# Patient Record
Sex: FEMALE | Race: WHITE | Employment: FULL TIME | URBAN - METROPOLITAN AREA
[De-identification: names, ages, dates, MRNs, and addresses within clinical notes are randomized per-mention and may not be internally consistent; named-entity substitution may affect disease eponyms.]

---

## 2017-01-20 ENCOUNTER — TELEPHONE (OUTPATIENT)
Dept: FAMILY MEDICINE CLINIC | Age: 42
End: 2017-01-20

## 2017-03-13 RX ORDER — LEVOTHYROXINE SODIUM 0.15 MG/1
TABLET ORAL
Qty: 30 TABLET | Refills: 1 | Status: SHIPPED | OUTPATIENT
Start: 2017-03-13

## 2017-03-22 RX ORDER — ARIPIPRAZOLE 5 MG
TABLET ORAL
Qty: 30 TABLET | Refills: 1 | Status: SHIPPED | OUTPATIENT
Start: 2017-03-22

## 2017-06-08 ENCOUNTER — HOSPITAL ENCOUNTER (OUTPATIENT)
Dept: MAMMOGRAPHY | Age: 42
Discharge: HOME | End: 2017-06-08
Admitting: OBSTETRICS & GYNECOLOGY

## 2017-06-08 DIAGNOSIS — Z12.31 VISIT FOR SCREENING MAMMOGRAM: ICD-10-CM

## 2018-10-04 ENCOUNTER — HOSPITAL ENCOUNTER (OUTPATIENT)
Dept: MAMMOGRAPHY | Age: 43
Discharge: HOME | End: 2018-10-04
Payer: COMMERCIAL

## 2018-10-04 DIAGNOSIS — Z12.31 VISIT FOR SCREENING MAMMOGRAM: ICD-10-CM

## 2018-10-04 PROCEDURE — 77063 BREAST TOMOSYNTHESIS BI: CPT

## 2020-01-30 ENCOUNTER — HOSPITAL ENCOUNTER (OUTPATIENT)
Dept: MAMMOGRAPHY | Age: 45
Discharge: HOME | End: 2020-01-30
Payer: COMMERCIAL

## 2020-01-30 PROCEDURE — 77063 BREAST TOMOSYNTHESIS BI: CPT

## 2021-06-01 ENCOUNTER — HOSPITAL ENCOUNTER (OUTPATIENT)
Dept: MAMMOGRAPHY | Age: 46
Discharge: HOME | End: 2021-06-01
Payer: COMMERCIAL

## 2021-06-01 DIAGNOSIS — Z12.31 BREAST CANCER SCREENING BY MAMMOGRAM: ICD-10-CM

## 2021-06-01 PROCEDURE — 77063 BREAST TOMOSYNTHESIS BI: CPT

## 2022-08-08 ENCOUNTER — HOSPITAL ENCOUNTER (OUTPATIENT)
Dept: MAMMOGRAPHY | Age: 47
Discharge: HOME | End: 2022-08-08
Payer: COMMERCIAL

## 2022-08-08 DIAGNOSIS — Z12.31 BREAST CANCER SCREENING BY MAMMOGRAM: ICD-10-CM

## 2022-08-08 PROCEDURE — 77063 BREAST TOMOSYNTHESIS BI: CPT

## 2022-08-22 ENCOUNTER — OFFICE VISIT (OUTPATIENT)
Dept: ORTHOPEDIC SURGERY | Age: 47
End: 2022-08-22
Payer: COMMERCIAL

## 2022-08-22 VITALS — BODY MASS INDEX: 39.27 KG/M2 | WEIGHT: 230 LBS | HEIGHT: 64 IN

## 2022-08-22 DIAGNOSIS — M25.562 ACUTE PAIN OF LEFT KNEE: ICD-10-CM

## 2022-08-22 DIAGNOSIS — M17.12 PRIMARY OSTEOARTHRITIS OF LEFT KNEE: Primary | ICD-10-CM

## 2022-08-22 PROCEDURE — 99203 OFFICE O/P NEW LOW 30 MIN: CPT | Performed by: ORTHOPAEDIC SURGERY

## 2022-08-22 RX ORDER — MELOXICAM 15 MG/1
15 TABLET ORAL DAILY
Qty: 30 TABLET | Refills: 0 | Status: SHIPPED | OUTPATIENT
Start: 2022-08-22 | End: 2022-09-22

## 2022-08-22 RX ORDER — OMEPRAZOLE 40 MG/1
CAPSULE, DELAYED RELEASE ORAL
COMMUNITY
Start: 2022-06-28

## 2022-08-22 NOTE — PROGRESS NOTES
ORTHOPAEDIC SURGERY H&P / CONSULTATION NOTE    Chief complaint:   Chief Complaint   Patient presents with    Knee Pain     NP L KNEE      History of present illness: The patient is a 41 y.o. female with subjective symptoms of left knee pain. The chief complaint is located at left knee pain. Duration of symptoms has been for 4 days. The severity of symptoms is rated at 6/10 pain on intake form. Patient states lateral posterior lateral pain when she was in a Uvaldo class she was hopping and felt a discomfort/pop. She states that symptoms have gotten dramatically better. She denies significant swelling. She is been taking OTC anti-inflammatories and ice over the weekend. She stopped taking anti-inflammatory given its been feeling better. She denies mechanical twisting knee pain. She denies gross instability. She states that she is been doing Uvaldo to aid in her overall weight maintenance. She denies prior injury to the left knee but does state anterior and anterior lateral knee pain with ADLs and prolonged sit to stand and car rides. Dull throbbing aching pain. The patient has tried the below listed items prior to today's consultation for above listed chief complaint.     +   Over-the-counter anti-inflammatories/prescription medication anti-inflammatory. -   Physical therapy / guided home exercise program -     -   Previous corticosteroid injections    Past medical history:    Past Medical History:   Diagnosis Date    Depression     Hypothyroidism         Past surgical history:    Past Surgical History:   Procedure Laterality Date    CARPAL TUNNEL RELEASE      DILATION AND CURETTAGE OF UTERUS          Allergies:     Allergies   Allergen Reactions    Celexa [Citalopram]      Therapeutic Failure     Sulfa Antibiotics          Medications:   Current Outpatient Medications:     omeprazole (PRILOSEC) 40 MG delayed release capsule, TAKE ONE CAPSULE BY MOUTH DAILY, Disp: , Rfl:     meloxicam (MOBIC) 15 MG tablet, Take 1 tablet by mouth daily, Disp: 30 tablet, Rfl: 0    ABILIFY 5 MG tablet, TAKE ONE TABLET BY MOUTH DAILY, Disp: 30 tablet, Rfl: 1    levothyroxine (SYNTHROID) 150 MCG tablet, TAKE ONE TABLET BY MOUTH DAILY, Disp: 30 tablet, Rfl: 1    escitalopram (LEXAPRO) 20 MG tablet, TAKE ONE AND ONE-HALF TABLET BY MOUTH DAILY, Disp: 45 tablet, Rfl: 6    B Complex Vitamins (VITAMIN B COMPLEX PO), Take by mouth, Disp: , Rfl:     Ascorbic Acid (VITAMIN C) 500 MG tablet, Take 500 mg by mouth daily, Disp: , Rfl:      Social history: Denies IV drug use. Social History     Socioeconomic History    Marital status:      Spouse name: Not on file    Number of children: Not on file    Years of education: Not on file    Highest education level: Not on file   Occupational History    Not on file   Tobacco Use    Smoking status: Never    Smokeless tobacco: Never   Substance and Sexual Activity    Alcohol use: Yes     Comment: OCCAS. Drug use: No    Sexual activity: Not on file   Other Topics Concern    Not on file   Social History Narrative    Not on file     Social Determinants of Health     Financial Resource Strain: Not on file   Food Insecurity: Not on file   Transportation Needs: Not on file   Physical Activity: Not on file   Stress: Not on file   Social Connections: Not on file   Intimate Partner Violence: Not on file   Housing Stability: Not on file     Tobacco use. Social History     Tobacco Use   Smoking Status Never   Smokeless Tobacco Never     Em[place]    [place]    Review of systems: Patient denies any fevers chills chest pain shortness of breath nausea vomiting significant weight loss any change in voiding or bowel movements. Patient denies any significant numbness or tingling at baseline as it relates to this presenting symptom/chief complaint. The patient denies any significant problems with skin or any significant allergies.        Physical examination:  Body mass index is 39.48 kg/m². AAOx3, NCAT  EOMI  MMM  RR  Unlabored breathing, no wheezing  Skin intact BUE and BLE, warm and moist  Bilateral lower extremity examination specific to subjective symptoms  Exam Right Lower Extremity  Negative effusion, 0/122/0 active ROM (E/F/Lag), same P assive ROM (E/F/Lag), negative anterior Drawer, 1A Lachman, negative   posterior Drawer,   Stable varus/valgus at 0 and 30?,    none TTP Joint Line, negative Sofia, positive Nilesh's, positive lateral patellar facet tenderness. Skin intact throughout  5/5 IP Q H TA G EHL  SILT DP SP LP MP S S  +2 DP pulse      Diagnostic imaging:  MY READ:  4V R knee 8/22/22: Negative fracture. Positive arthrosis patellofemoral greater than lateral compartment greater than medial compartment. Pertinent lab work:  None       Diagnosis Orders   1. Primary osteoarthritis of left knee  meloxicam (MOBIC) 15 MG tablet      2. Acute pain of left knee            Assessment and plan: 41 y.o. female with current subjective symptoms and physical exam findings with diagnostic imaging correlating to left knee osteoarthritis. -Time of 16 minutes was spent coordinating and discussing the clinical findings, reviewing diagnostic imaging as indicated, coordinating care with prior notes review and current clinical encounter documentation as it pertains to the patient's presenting subjective symptoms and diagnoses. -I reviewed with the patient the imaging findings as well as clinical exam and  how it correlates to subjective symptoms.  -I had a pleasant discussion with the patient and family member that accompanies her. I reviewed with her at this time that her exam is effectively benign. There is no gross mechanical findings with regard to meniscal pathology either on exam or subjectively and her knee is clinically stable with regard to cruciate and collateral ligament testing.  -I reviewed with her consideration for conservative care treatment options.   This included No anti-inflammatories and therapy. She wishes to pursue this. Given current examination relatively well-appearing, I did not advocate for a corticosteroid injection today.  -Mobic 15 mg p.o. daily as needed pain and OTC Tylenol per bottle as needed discomfort  -Physician directed physical therapy program was printed out and given to the patient today. She will also work on low impact activities elliptical stationary bike swimming and walking  -I suspect over the next 7 days that she will have ultimate resolution of symptoms. Should she have any mechanical twisting knee pain or should pain persist in the next 2 to 3 weeks, then consideration for MRI left knee to rule out meniscal pathology and intra-articular pathology beyond anticipated chondromalacia/osteoarthritis given patient's clinical exam and current radiographic findings  -All questions answered to the patient's satisfaction and the patient expressed understanding and agreement with the above listed treatment plan  -Follow up in 2 to 3 weeks per the above  -Thank you for the clinical consultation and allowing me to participate in the patient's care. Electronically signed by Bailey Campoverde MD on 8/22/22 at 10:22 AM BLESSING Campoverde MD       Orthopaedic Surgery-Sports Medicine        Disclaimer: This note was dictated with voice recognition software. Though review and correction are routinely performed, please contact the office/medical records for any errors requiring correction. no dermatitis, no environmental allergies, no food allergies, no immunosuppressive disorder, and no pruritus.

## 2022-09-01 ENCOUNTER — TELEPHONE (OUTPATIENT)
Dept: ORTHOPEDIC SURGERY | Age: 47
End: 2022-09-01

## 2022-09-01 DIAGNOSIS — M25.562 ACUTE PAIN OF LEFT KNEE: Primary | ICD-10-CM

## 2022-09-01 NOTE — TELEPHONE ENCOUNTER
General Question     Subject: MRI LT KNEE  Patient and /or Facility Request: Haylee Mas  Contact Number: [phone]    PATIENT IS CALLING REQ MRI FOR LEFT KNEE.     PLEASE ADVISE

## 2022-09-02 NOTE — TELEPHONE ENCOUNTER
MRI order placed , will call patient when authorization is obtained. Patient called and is aware.  KB

## 2022-09-06 ENCOUNTER — TELEPHONE (OUTPATIENT)
Dept: ORTHOPEDIC SURGERY | Age: 47
End: 2022-09-06

## 2022-09-06 NOTE — TELEPHONE ENCOUNTER
MRI LT KNEE APPROVED. [facility] # [id number]  DATE RANGE 09/06/22-11/04/22. -[address] patient, order faxed , left message.  KB

## 2022-09-12 ENCOUNTER — TELEPHONE (OUTPATIENT)
Dept: ORTHOPEDIC SURGERY | Age: 47
End: 2022-09-12

## 2022-09-12 ENCOUNTER — OFFICE VISIT (OUTPATIENT)
Dept: ORTHOPEDIC SURGERY | Age: 47
End: 2022-09-12
Payer: COMMERCIAL

## 2022-09-12 VITALS — WEIGHT: 230 LBS | HEIGHT: 64 IN | BODY MASS INDEX: 39.27 KG/M2

## 2022-09-12 DIAGNOSIS — M22.42 CHONDROMALACIA OF LEFT PATELLA: ICD-10-CM

## 2022-09-12 DIAGNOSIS — S83.232A COMPLEX TEAR OF MEDIAL MENISCUS OF LEFT KNEE AS CURRENT INJURY, INITIAL ENCOUNTER: Primary | ICD-10-CM

## 2022-09-12 PROCEDURE — 99214 OFFICE O/P EST MOD 30 MIN: CPT | Performed by: ORTHOPAEDIC SURGERY

## 2022-09-12 NOTE — PROGRESS NOTES
FOLLOW UP ORTHOPAEDIC NOTE    The patient follows up today for reevaluation of left knee. The patient states she received her MRI and follows up for results today. She states that she is been having continued pain anterior as well as medial base/posterior based in the knee which she has noticed more so with ADLs. Previous Mobic has not adequately alleviated the patient's pain is so she switched over-the-counter medications but also has not alleviated her pain. She has modified her activities as well as having been doing the physician directed physical therapy program.  The pain has been limiting her ADLs    PE:  AAOx3  RR  Unlabored breathing  Skin warm and moist  Focused physical examination of the left knee  Positive Nilesh's, positive medial Sofia, positive medial Thessaly. Unchanged remainder of examination    Pertinent radiographs/imaging:  MRI 9/10/2022 left knee  CONCLUSION:   1. Medial meniscus: Complex tearing of the posterior root. Lateral meniscus: Intact. 2. Intact cruciate and collateral ligaments. 3. Patellofemoral compartment: Full-thickness chondromalacia of the median ridge and adjacent    medial and lateral patellar facet articular cartilage with underlying subchondral edema and    tiny subchondral mechanical erosion. 4. Medial tibiofemoral compartment: Focal high-grade chondromalacia in the central    weight-bearing femoral condyle articular cartilage measuring up to 1.1 cm anterior-posterior by    0.4 cm transverse. Preserved subchondral bone. 5. Small volume suprapatellar effusion. Decompressed/ruptured Baker's cyst.     MY READ: ACL intact with mucoid degeneration. PCL LCL MCL intact. No gross lateral meniscus tear. Positive posterior medial meniscal root tear with very slight medial extrusion. Positive patellar chondromalacia mild to moderate. Trace medial lateral compartment chondromalacia. Diagnosis Orders   1.  Complex tear of medial meniscus of left knee as current injury, initial encounter        2. Chondromalacia of left patella            Assessment and plan: 41 female with continued subjective symptoms of left knee pain with known, correlating diagnosis of left knee medial meniscal root tear and chondromalacia. -Time of 16 minutes was spent coordinating and discussing the clinical findings and diagnostic imaging results as they pertain to the patient's presenting subjective symptoms.  -I had a pleasant discussion with the patient today. I reviewed with her directly her MRI images. She continues to have functional limitations and daily pain. This was from a traumatic injury during Uvaldo class with weightbearing activity. This would appear to be chronic patellar chondromalacia in the setting of knee osteoarthritis/chondromalacia which was previously diagnosed. I also reviewed with her her posterior medial meniscal root tear which is present. I did review with her consideration for nonoperative versus operative treatment measures. Given meniscal pathology as well as chondromalacia/early mild osteoarthritis, this would be consideration for corticosteroid injection and continued activity modification versus consideration for left knee arthroscopic medial meniscal root repair and chondroplasty. She is been to think about her treatment options however is trending toward surgery and I reviewed with her the perioperative course and what it would entail.  -Continue activity modification for the time being and OTC Tylenol/Aleve per bottle as needed discomfort  -Authorizations to be submitted to insurance and we will follow-up with the patient as she states that she can work with her job. I did review with her likely 6 weeks of brace use and protected weightbearing with progressions given root repair.   She expressed understanding and overall treatment plan duration with expectations for symptomatic improvement with regard to mechanical twisting knee pain associated with meniscal pathology however with regard to patellar related chondromalacia/osteoarthritis with chondroplasty to be performed, possible additional treatment options to include conservative care options with regard to that entity should there be continued pain associated  -All questions answered to the patient's satisfaction and the patient expressed understanding and agreement with the above listed treatment plan  -Follow up in 10 to 12 days postop per routine  -Thank you for the clinical consultation and allowing me to participate in the patient's care. Electronically signed by Bailey Campoverde MD on 9/12/22 at 10:00 AM BLESSING Campoverde MD       Orthopaedic Surgery-Sports Medicine    Disclaimer: This note was dictated with voice recognition software. Though review and correction are routinely performed, please contact the office/medical records for any errors requiring correction.

## 2022-09-12 NOTE — TELEPHONE ENCOUNTER
General Question     Subject: PATIENT NEEDS A PROCEDURE CODE FOR HER INSURANCE   Patient and /or Facility Request: Viktoriya Hoffman  Contact Number:  [phone]    PATIENT NEEDS A PROCEDURE CODE FOR HER INSURANCE.  PLEASE CALL PATIENT BACK AT THE NUMBER ABOVE

## 2022-09-12 NOTE — LETTER
[address]   DR. Alvan Nyhan     TODAY'S DATE: 22    PATIENT'S NAME: Sachi Zhong    PATIENT'S NUMBER: [id number]    : 1975    PREFERRED PHONE NUMBER: [phone]      WORK PHONE NUMBER: [phone] (work)         DIAGNOSIS:   Left knee medial meniscus root tear, patellar chondromalacia    PROCEDURE: Left knee arthroscopic medial meniscal root repair, chondroplasty    SURGEON: Dr. Rosa Ellis: Elective    HOSPITAL: [facility]: Outpatient/Same Day Surgery    ANESTHESIA: General  Pre-Op Block requested NONE    LENGTH OF SURGERY: 1 Hr    EQUIPMENT REQUESTED: Arthrex medial meniscal root repair kit      X-RAYS REQUIRED: Mini C-ARM    ANTIBIOTICS: Ancef 2gm IV x 1    MEDICATIONS: TXA 1gm IV x1 at induction of anesthesia AND TXA 1gm IV x 1 at wound closure    LABS: None          PCP: Anyi Falcon MD    H&P TO BE DONE BY THE PCP      CARDIAC CLEARANCE NEEDED: No     ALLERGIES:   Allergies   Allergen Reactions    Celexa [Citalopram]      Therapeutic Failure     Sulfa Antibiotics        DME: Crutches and Knee Bregg Hinged Knee Brace    POST-OP VISIT: 10-12 days    OTHER INSTRUCTIONS/REMARKS: Arthrex medial mensicus root repair and button    INSURANCE INFORMATION: _________________________ CARD IN MEDIA IN EPIC___  CARD FAXED___    PRE-CERTIFICATION REQUIRED: YES   NO   PER _______________________    SURGERY SCHEDULED BY: ____________________________________    PATIENT CALLED AND CONFIRMED DATE & TIME: ______________________             Leti Marte MD       Orthopaedic Surgery - Sports Medicine

## 2022-09-13 ENCOUNTER — TELEPHONE (OUTPATIENT)
Dept: ORTHOPEDIC SURGERY | Age: 47
End: 2022-09-13

## 2022-09-13 NOTE — TELEPHONE ENCOUNTER
Surgery and/or Procedure Scheduling     Contact Name: Jude Lopez Request: LT KNEE  Patient Contact Number: [phone]    Patient is req a return call to schedule LT knee sx. Please return call to the above number.

## 2022-09-14 DIAGNOSIS — S83.232A COMPLEX TEAR OF MEDIAL MENISCUS OF LEFT KNEE AS CURRENT INJURY, INITIAL ENCOUNTER: Primary | ICD-10-CM

## 2022-09-15 ENCOUNTER — TELEPHONE (OUTPATIENT)
Dept: ORTHOPEDIC SURGERY | Age: 47
End: 2022-09-15

## 2022-09-15 NOTE — TELEPHONE ENCOUNTER
Auth: [employer]  Date: 09/23/22  Reference # [id number]  Spoke with: JOSEPH AUTOMATED CALL  Type of SX: OUTPATIENT  Location: [facility]  CPT: 52813, 84683   DX: [phone]  SX area:  KNEE  Insurance: [address] [place] [address]

## 2022-09-15 NOTE — PROGRESS NOTES
Reji Souzat    Age 41 y.o.    female    1975    MRN [id number]    9/23/2022  Arrival Time_____________  OR Time____________[facility]     Procedure(s):  VIDEO ARTHROSCOPY LEFT KNEE, MEDIAL MENISCAL ROOT REPAIR, CHONDROPLASTY                      General    Surgeon(s):  [facility], MD       Phone [phone] (home) [phone] (work)    [address] [place] [address]  Cell         Work  _____________________________________________________________________  _____________________________________________________________________  _____________________________________________________________________  _____________________________________________________________________  _____________________________________________________________________    PCP _____________________________ Phone_________________     H&P__________________Bringing      Chart            Epic   DOS      Called________  EKG__________________Bringing      Chart            Epic   DOS      Called________  LAB__________________ Bringing      Chart            Epic   DOS      Called________  Cardiac Clearance_______Bringing      Chart            Epic      DOS      Called________    Cardiologist________________________ Phone___________________________    ? Hoahaoism concerns / Waiver on Chart            [employer]________________  ? Pre-op Instructions Given [place]          _________________________________  ? Directions to Surgery Center                          _________________________________  ? Transportation Home_______________      __________________________________  ?  Crutches/Walker__________________        __________________________________    ________Pre-op Orders   _______Transcribed    _______Wt.  ________Pharmacy          _______SCD  ______VTE     ______TED Joy Obey  _______  Surgery Consent    _______  Anesthesia Consent         COVID DATE______________LOCATION________________ RESULT__________

## 2022-09-17 DIAGNOSIS — M17.12 PRIMARY OSTEOARTHRITIS OF LEFT KNEE: ICD-10-CM

## 2022-09-19 RX ORDER — MELOXICAM 15 MG/1
TABLET ORAL
Qty: 30 TABLET | Refills: 0 | OUTPATIENT
Start: 2022-09-19

## 2022-09-19 NOTE — TELEPHONE ENCOUNTER
I called and talke dto the patient. She stated she never called in for a refill. She also, stated that she stopped taking it.

## 2022-09-22 ENCOUNTER — ANESTHESIA EVENT (OUTPATIENT)
Dept: OPERATING ROOM | Age: 47
End: 2022-09-22
Payer: COMMERCIAL

## 2022-09-22 NOTE — PROGRESS NOTES

## 2022-09-22 NOTE — PROGRESS NOTES
Obstructive Sleep Apnea (LAMIN) Screening     Patient:  Lorie Orellana    YOB: 1975      Medical Record #:  [id number]                     Date:  9/22/2022     1. Are you a loud and/or regular snorer? []  Yes       [x] No    2. Have you been observed to gasp or stop breathing during sleep? []  Yes       [x] No    3. Do you feel tired or groggy upon awakening or do you awaken with a headache?           []  Yes       [] No    4. Are you often tired or fatigued during the wake time hours? []  Yes       [] No    5. Do you fall asleep sitting, reading, watching TV or driving? []  Yes       [] No    6. Do you often have problems with memory or concentration? []  Yes       [] No    **If patient's score is ? 3 they are considered high risk for LAMIN. An Anesthesia provider will evaluate the patient and develop a plan of care the day of surgery. Note:  If the patient's BMI is more than 35 kg m¯² , has neck circumference > 40 cm, and/or high blood pressure the risk is greater (© American Sleep Apnea Association, 2006).

## 2022-09-23 ENCOUNTER — HOSPITAL ENCOUNTER (OUTPATIENT)
Age: 47
Setting detail: OUTPATIENT SURGERY
Discharge: HOME | End: 2022-09-23
Attending: ORTHOPAEDIC SURGERY | Admitting: ORTHOPAEDIC SURGERY
Payer: COMMERCIAL

## 2022-09-23 ENCOUNTER — ANESTHESIA (OUTPATIENT)
Dept: OPERATING ROOM | Age: 47
End: 2022-09-23
Payer: COMMERCIAL

## 2022-09-23 VITALS
HEIGHT: 64 IN | DIASTOLIC BLOOD PRESSURE: 76 MMHG | OXYGEN SATURATION: 93 % | BODY MASS INDEX: 38.41 KG/M2 | TEMPERATURE: 97.2 F | SYSTOLIC BLOOD PRESSURE: 138 MMHG | RESPIRATION RATE: 16 BRPM | HEART RATE: 84 BPM | WEIGHT: 225 LBS

## 2022-09-23 DIAGNOSIS — Z47.89 ORTHOPEDIC AFTERCARE: Primary | ICD-10-CM

## 2022-09-23 LAB — PREGNANCY, URINE: NEGATIVE

## 2022-09-23 PROCEDURE — 6360000002 HC RX W HCPCS: Performed by: NURSE ANESTHETIST, CERTIFIED REGISTERED

## 2022-09-23 PROCEDURE — 2580000003 HC RX 258: Performed by: ANESTHESIOLOGY

## 2022-09-23 PROCEDURE — 3600000014 HC SURGERY LEVEL 4 ADDTL 15MIN: Performed by: ORTHOPAEDIC SURGERY

## 2022-09-23 PROCEDURE — 6370000000 HC RX 637 (ALT 250 FOR IP): Performed by: ANESTHESIOLOGY

## 2022-09-23 PROCEDURE — 6360000002 HC RX W HCPCS: Performed by: ORTHOPAEDIC SURGERY

## 2022-09-23 PROCEDURE — 2580000003 HC RX 258: Performed by: ORTHOPAEDIC SURGERY

## 2022-09-23 PROCEDURE — C1769 GUIDE WIRE: HCPCS | Performed by: ORTHOPAEDIC SURGERY

## 2022-09-23 PROCEDURE — 2709999900 HC NON-CHARGEABLE SUPPLY: Performed by: ORTHOPAEDIC SURGERY

## 2022-09-23 PROCEDURE — 2500000003 HC RX 250 WO HCPCS: Performed by: NURSE ANESTHETIST, CERTIFIED REGISTERED

## 2022-09-23 PROCEDURE — C1713 ANCHOR/SCREW BN/BN,TIS/BN: HCPCS | Performed by: ORTHOPAEDIC SURGERY

## 2022-09-23 PROCEDURE — 2500000003 HC RX 250 WO HCPCS: Performed by: ORTHOPAEDIC SURGERY

## 2022-09-23 PROCEDURE — 7100000010 HC PHASE II RECOVERY - FIRST 15 MIN: Performed by: ORTHOPAEDIC SURGERY

## 2022-09-23 PROCEDURE — 6360000002 HC RX W HCPCS: Performed by: ANESTHESIOLOGY

## 2022-09-23 PROCEDURE — 7100000011 HC PHASE II RECOVERY - ADDTL 15 MIN: Performed by: ORTHOPAEDIC SURGERY

## 2022-09-23 PROCEDURE — 7100000001 HC PACU RECOVERY - ADDTL 15 MIN: Performed by: ORTHOPAEDIC SURGERY

## 2022-09-23 PROCEDURE — 3700000000 HC ANESTHESIA ATTENDED CARE: Performed by: ORTHOPAEDIC SURGERY

## 2022-09-23 PROCEDURE — 3700000001 HC ADD 15 MINUTES (ANESTHESIA): Performed by: ORTHOPAEDIC SURGERY

## 2022-09-23 PROCEDURE — 2720000010 HC SURG SUPPLY STERILE: Performed by: ORTHOPAEDIC SURGERY

## 2022-09-23 PROCEDURE — 3600000004 HC SURGERY LEVEL 4 BASE: Performed by: ORTHOPAEDIC SURGERY

## 2022-09-23 PROCEDURE — 84703 CHORIONIC GONADOTROPIN ASSAY: CPT

## 2022-09-23 PROCEDURE — 29882 ARTHRS KNE SRG MNISC RPR M/L: CPT | Performed by: ORTHOPAEDIC SURGERY

## 2022-09-23 PROCEDURE — 7100000000 HC PACU RECOVERY - FIRST 15 MIN: Performed by: ORTHOPAEDIC SURGERY

## 2022-09-23 DEVICE — BIO-COMP SWVLK C, CLD 4.75X19.1MM
Type: IMPLANTABLE DEVICE | Site: KNEE | Status: FUNCTIONAL
Brand: ARTHREX®

## 2022-09-23 RX ORDER — BUPIVACAINE HYDROCHLORIDE 2.5 MG/ML
INJECTION, SOLUTION INFILTRATION; PERINEURAL PRN
Status: DISCONTINUED | OUTPATIENT
Start: 2022-09-23 | End: 2022-09-23 | Stop reason: HOSPADM

## 2022-09-23 RX ORDER — OXYCODONE HYDROCHLORIDE 5 MG/1
5 TABLET ORAL
Status: COMPLETED | OUTPATIENT
Start: 2022-09-23 | End: 2022-09-23

## 2022-09-23 RX ORDER — DEXAMETHASONE SODIUM PHOSPHATE 10 MG/ML
INJECTION INTRAMUSCULAR; INTRAVENOUS PRN
Status: DISCONTINUED | OUTPATIENT
Start: 2022-09-23 | End: 2022-09-23 | Stop reason: SDUPTHER

## 2022-09-23 RX ORDER — SODIUM CHLORIDE 0.9 % (FLUSH) 0.9 %
5-40 SYRINGE (ML) INJECTION PRN
Status: DISCONTINUED | OUTPATIENT
Start: 2022-09-23 | End: 2022-09-23 | Stop reason: HOSPADM

## 2022-09-23 RX ORDER — HYDRALAZINE HYDROCHLORIDE 20 MG/ML
10 INJECTION INTRAMUSCULAR; INTRAVENOUS
Status: DISCONTINUED | OUTPATIENT
Start: 2022-09-23 | End: 2022-09-23 | Stop reason: HOSPADM

## 2022-09-23 RX ORDER — DIPHENHYDRAMINE HYDROCHLORIDE 50 MG/ML
12.5 INJECTION INTRAMUSCULAR; INTRAVENOUS
Status: DISCONTINUED | OUTPATIENT
Start: 2022-09-23 | End: 2022-09-23 | Stop reason: HOSPADM

## 2022-09-23 RX ORDER — LIDOCAINE HYDROCHLORIDE 10 MG/ML
INJECTION, SOLUTION EPIDURAL; INFILTRATION; INTRACAUDAL; PERINEURAL PRN
Status: DISCONTINUED | OUTPATIENT
Start: 2022-09-23 | End: 2022-09-23 | Stop reason: SDUPTHER

## 2022-09-23 RX ORDER — HYDROCODONE BITARTRATE AND ACETAMINOPHEN 5; 325 MG/1; MG/1
1 TABLET ORAL EVERY 6 HOURS PRN
Qty: 20 TABLET | Refills: 0 | Status: SHIPPED | OUTPATIENT
Start: 2022-09-23 | End: 2022-09-28

## 2022-09-23 RX ORDER — MIDAZOLAM HYDROCHLORIDE 1 MG/ML
2 INJECTION INTRAMUSCULAR; INTRAVENOUS
Status: DISCONTINUED | OUTPATIENT
Start: 2022-09-23 | End: 2022-09-23 | Stop reason: HOSPADM

## 2022-09-23 RX ORDER — PROPOFOL 10 MG/ML
INJECTION, EMULSION INTRAVENOUS PRN
Status: DISCONTINUED | OUTPATIENT
Start: 2022-09-23 | End: 2022-09-23 | Stop reason: SDUPTHER

## 2022-09-23 RX ORDER — FENTANYL CITRATE 50 UG/ML
INJECTION, SOLUTION INTRAMUSCULAR; INTRAVENOUS PRN
Status: DISCONTINUED | OUTPATIENT
Start: 2022-09-23 | End: 2022-09-23 | Stop reason: SDUPTHER

## 2022-09-23 RX ORDER — LABETALOL HYDROCHLORIDE 5 MG/ML
INJECTION, SOLUTION INTRAVENOUS PRN
Status: DISCONTINUED | OUTPATIENT
Start: 2022-09-23 | End: 2022-09-23 | Stop reason: SDUPTHER

## 2022-09-23 RX ORDER — TRANEXAMIC ACID 100 MG/ML
1000 INJECTION, SOLUTION INTRAVENOUS ONCE
Status: DISCONTINUED | OUTPATIENT
Start: 2022-09-23 | End: 2022-09-23 | Stop reason: HOSPADM

## 2022-09-23 RX ORDER — DEXAMETHASONE SODIUM PHOSPHATE 10 MG/ML
4 INJECTION INTRAMUSCULAR; INTRAVENOUS
Status: DISCONTINUED | OUTPATIENT
Start: 2022-09-23 | End: 2022-09-23 | Stop reason: HOSPADM

## 2022-09-23 RX ORDER — HYDRALAZINE HYDROCHLORIDE 20 MG/ML
INJECTION INTRAMUSCULAR; INTRAVENOUS PRN
Status: DISCONTINUED | OUTPATIENT
Start: 2022-09-23 | End: 2022-09-23 | Stop reason: SDUPTHER

## 2022-09-23 RX ORDER — SODIUM CHLORIDE, SODIUM LACTATE, POTASSIUM CHLORIDE, CALCIUM CHLORIDE 600; 310; 30; 20 MG/100ML; MG/100ML; MG/100ML; MG/100ML
INJECTION, SOLUTION INTRAVENOUS CONTINUOUS
Status: DISCONTINUED | OUTPATIENT
Start: 2022-09-23 | End: 2022-09-23 | Stop reason: HOSPADM

## 2022-09-23 RX ORDER — SODIUM CHLORIDE 9 MG/ML
INJECTION, SOLUTION INTRAVENOUS PRN
Status: DISCONTINUED | OUTPATIENT
Start: 2022-09-23 | End: 2022-09-23 | Stop reason: HOSPADM

## 2022-09-23 RX ORDER — MIDAZOLAM HYDROCHLORIDE 1 MG/ML
INJECTION INTRAMUSCULAR; INTRAVENOUS PRN
Status: DISCONTINUED | OUTPATIENT
Start: 2022-09-23 | End: 2022-09-23 | Stop reason: SDUPTHER

## 2022-09-23 RX ORDER — ONDANSETRON 2 MG/ML
INJECTION INTRAMUSCULAR; INTRAVENOUS PRN
Status: DISCONTINUED | OUTPATIENT
Start: 2022-09-23 | End: 2022-09-23 | Stop reason: SDUPTHER

## 2022-09-23 RX ORDER — ACETAMINOPHEN 325 MG/1
650 TABLET ORAL
Status: DISCONTINUED | OUTPATIENT
Start: 2022-09-23 | End: 2022-09-23 | Stop reason: HOSPADM

## 2022-09-23 RX ORDER — ONDANSETRON 2 MG/ML
4 INJECTION INTRAMUSCULAR; INTRAVENOUS
Status: DISCONTINUED | OUTPATIENT
Start: 2022-09-23 | End: 2022-09-23 | Stop reason: HOSPADM

## 2022-09-23 RX ORDER — IPRATROPIUM BROMIDE AND ALBUTEROL SULFATE 2.5; .5 MG/3ML; MG/3ML
1 SOLUTION RESPIRATORY (INHALATION)
Status: DISCONTINUED | OUTPATIENT
Start: 2022-09-23 | End: 2022-09-23 | Stop reason: HOSPADM

## 2022-09-23 RX ORDER — SODIUM CHLORIDE 0.9 % (FLUSH) 0.9 %
5-40 SYRINGE (ML) INJECTION EVERY 12 HOURS SCHEDULED
Status: DISCONTINUED | OUTPATIENT
Start: 2022-09-23 | End: 2022-09-23 | Stop reason: HOSPADM

## 2022-09-23 RX ORDER — MEPERIDINE HYDROCHLORIDE 50 MG/ML
12.5 INJECTION INTRAMUSCULAR; INTRAVENOUS; SUBCUTANEOUS EVERY 5 MIN PRN
Status: DISCONTINUED | OUTPATIENT
Start: 2022-09-23 | End: 2022-09-23 | Stop reason: HOSPADM

## 2022-09-23 RX ORDER — ROCURONIUM BROMIDE 10 MG/ML
INJECTION, SOLUTION INTRAVENOUS PRN
Status: DISCONTINUED | OUTPATIENT
Start: 2022-09-23 | End: 2022-09-23 | Stop reason: SDUPTHER

## 2022-09-23 RX ADMIN — LIDOCAINE HYDROCHLORIDE 3 ML: 10 INJECTION, SOLUTION EPIDURAL; INFILTRATION; INTRACAUDAL; PERINEURAL at 10:35

## 2022-09-23 RX ADMIN — PROPOFOL 50 MG: 10 INJECTION, EMULSION INTRAVENOUS at 10:58

## 2022-09-23 RX ADMIN — FENTANYL CITRATE 50 MCG: 50 INJECTION INTRAMUSCULAR; INTRAVENOUS at 10:58

## 2022-09-23 RX ADMIN — HYDRALAZINE HYDROCHLORIDE 10 MG: 20 INJECTION INTRAMUSCULAR; INTRAVENOUS at 11:19

## 2022-09-23 RX ADMIN — SODIUM CHLORIDE, SODIUM LACTATE, POTASSIUM CHLORIDE, AND CALCIUM CHLORIDE: .6; .31; .03; .02 INJECTION, SOLUTION INTRAVENOUS at 11:24

## 2022-09-23 RX ADMIN — SUGAMMADEX 200 MG: 100 INJECTION, SOLUTION INTRAVENOUS at 12:44

## 2022-09-23 RX ADMIN — PROPOFOL 130 MG: 10 INJECTION, EMULSION INTRAVENOUS at 10:35

## 2022-09-23 RX ADMIN — ROCURONIUM BROMIDE 50 MG: 10 SOLUTION INTRAVENOUS at 10:35

## 2022-09-23 RX ADMIN — HYDROMORPHONE HYDROCHLORIDE 0.5 MG: 1 INJECTION, SOLUTION INTRAMUSCULAR; INTRAVENOUS; SUBCUTANEOUS at 13:03

## 2022-09-23 RX ADMIN — DEXAMETHASONE SODIUM PHOSPHATE 5 MG: 10 INJECTION INTRAMUSCULAR; INTRAVENOUS at 12:30

## 2022-09-23 RX ADMIN — CEFAZOLIN 2000 MG: 10 INJECTION, POWDER, FOR SOLUTION INTRAVENOUS at 10:40

## 2022-09-23 RX ADMIN — HYDROMORPHONE HYDROCHLORIDE 0.25 MG: 1 INJECTION, SOLUTION INTRAMUSCULAR; INTRAVENOUS; SUBCUTANEOUS at 13:41

## 2022-09-23 RX ADMIN — OXYCODONE HYDROCHLORIDE 5 MG: 5 TABLET ORAL at 13:24

## 2022-09-23 RX ADMIN — LABETALOL HYDROCHLORIDE 10 MG: 5 INJECTION, SOLUTION INTRAVENOUS at 11:22

## 2022-09-23 RX ADMIN — FENTANYL CITRATE 50 MCG: 50 INJECTION INTRAMUSCULAR; INTRAVENOUS at 10:31

## 2022-09-23 RX ADMIN — SODIUM CHLORIDE, SODIUM LACTATE, POTASSIUM CHLORIDE, AND CALCIUM CHLORIDE: .6; .31; .03; .02 INJECTION, SOLUTION INTRAVENOUS at 10:15

## 2022-09-23 RX ADMIN — MIDAZOLAM HYDROCHLORIDE 2 MG: 2 INJECTION, SOLUTION INTRAMUSCULAR; INTRAVENOUS at 10:31

## 2022-09-23 RX ADMIN — ONDANSETRON 4 MG: 2 INJECTION INTRAMUSCULAR; INTRAVENOUS at 12:30

## 2022-09-23 RX ADMIN — HYDROMORPHONE HYDROCHLORIDE 0.5 MG: 1 INJECTION, SOLUTION INTRAMUSCULAR; INTRAVENOUS; SUBCUTANEOUS at 13:17

## 2022-09-23 RX ADMIN — HYDRALAZINE HYDROCHLORIDE 10 MG: 20 INJECTION INTRAMUSCULAR; INTRAVENOUS at 11:16

## 2022-09-23 RX ADMIN — FENTANYL CITRATE 50 MCG: 50 INJECTION INTRAMUSCULAR; INTRAVENOUS at 11:06

## 2022-09-23 RX ADMIN — FENTANYL CITRATE 50 MCG: 50 INJECTION INTRAMUSCULAR; INTRAVENOUS at 10:52

## 2022-09-23 ASSESSMENT — PAIN DESCRIPTION - DESCRIPTORS: DESCRIPTORS: ACHING

## 2022-09-23 ASSESSMENT — PAIN DESCRIPTION - ORIENTATION
ORIENTATION: LEFT

## 2022-09-23 ASSESSMENT — PAIN SCALES - GENERAL
PAINLEVEL_OUTOF10: 8
PAINLEVEL_OUTOF10: 8
PAINLEVEL_OUTOF10: 0
PAINLEVEL_OUTOF10: 9
PAINLEVEL_OUTOF10: 8

## 2022-09-23 ASSESSMENT — PAIN DESCRIPTION - LOCATION
LOCATION: KNEE

## 2022-09-23 ASSESSMENT — ENCOUNTER SYMPTOMS: SHORTNESS OF BREATH: 1

## 2022-09-23 NOTE — ANESTHESIA POSTPROCEDURE EVALUATION
Department of Anesthesiology  Postprocedure Note    Patient: Isela Brunner  MRN: [id number]  YOB: 1975  Date of evaluation: 9/23/2022      Procedure Summary     Date: 09/23/22 Room / Location: [facility] [place] [address] [facility] [address] / [place]    Anesthesia Start: 1030 Anesthesia Stop: 8029    Procedure: VIDEO ARTHROSCOPY LEFT [facility], CHONDROPLASTY (Left: Knee) Diagnosis:       Acute medial meniscus tear of left knee, initial encounter      (LEFT KNEE MEDIAL MENISCUS ROOT TEAR, PATELLAR CHONDROMALACIA)    Surgeons: Arvind Lei MD Responsible Provider: Juana Martin MD    Anesthesia Type: MAC ASA Status: 2          Anesthesia Type: No value filed.     Bertha Phase I: Bertha Score: 7    Bertha Phase II:        Anesthesia Post Evaluation    Patient location during evaluation: PACU  Patient participation: complete - patient participated  Level of consciousness: awake  Pain score: 2  Airway patency: patent  Nausea & Vomiting: no nausea  Complications: no  Cardiovascular status: blood pressure returned to baseline  Respiratory status: acceptable  Hydration status: euvolemic

## 2022-09-23 NOTE — DISCHARGE INSTRUCTIONS
Orthopaedic Sports Medicine  Post-Operative Discharge Instructions      Pain Medication/Management  - Narcotic electronic-scribed to pharmacy listed in EPIC (Follow prescription instructions)  - IF taking a narcotic with acetaminophen then do not take additional acetaminophen. IF not, then Tylenol (650mg) - take 1 tab every 8 hours x 1 week  - Enteric Coated Aspirin (325mg) Over The Counter - take 1 tab daily x 3 weeks with food -  Start in AM on 9/24/22  - Colace (100mg) Over The Counter - take 1 tab twice daily as needed for a stool softener  * Wean off narcotic and start Tylenol (500mg) Over The Counter - take 1-2 tabs every 8 hours as needed for pain    Non weightbearing x 24hrs then 25% partial weightbearing left leg, use knee brace at all times except for hygiene. Ice left knee. Elevate left ankle above left knee and pump ankle up and down to circulate blood. May start -10 to 90 degrees range of motion on 9/24/22 by unlocking brace and may shower on 9/26/22, pat wounds dry air dry and then place gauze and ACE wrap to cover all wounds and then return to brace use. Bridging knee extension exercises a must.  Call PT in 24hrs to activate PT referral to start PT on 9/28/22. Check all paperwork for surgeon's follow up appointment date and time. Call office [phone] with any questions or concerns     * Day of Surgery - If you had a regional nerve block (extremity was numbed by anesthesia), it will wear off in 6-16 hours after surgery. It is recommended that you start the narcotic prescription once you get home to stay ahead of pain control even if the nerve block has not worn off yet. This will help limit severe pain from waking you up. It is also reasonable to set your alarm for every 6 hours so that you don't get behind in your pain control. * Elevation and Ice - For lower extremity surgery, elevate the operative extremity with rolled towels / pillows placed under the ankle/calf as tolerated. Move the position of towels or pillows around every so often to limit undue pressure to the back of the calf/heel. NEVER place pillows or blankets under the knee. For shoulder and upper extremity surgery, position pillows behind your back to aid in swelling reduction and comfort with sleep, and if it applies, keep the wrist above the elbow as tolerated while in the sling. Dressing/Shower  - If you had a shoulder or knee arthroscopy, you may shower in the PM on the 3rd post-operative day. Pat the wounds dry as well and place band-aids over the smaller wounds and gauze/ACE wrap over larger wounds. Do NOT submerge the wounds in hot-tubs, bathtubs, or ocean for at least 1 month postoperatively. - If you had a surgery other than the above listed (or if otherwise stated on hospital discharge instructions), then remove the dressing in the PM on the 7th post-operative day. Shower and pat the wounds dry, then place a clean dry dressing with ACE wrap over the wound(s). Brace  - Must ALWAYS wear the brace/sling applied by the surgeon. Unless otherwise instructed, unlock the brace the day after surgery to work on range of motion as it is set on the brace/listed in discharge instruction. May remove brace for hygiene. IF the operative extremity is in a well-padded splint/cast, do NOT remove it. It will be removed at the first post-operative office visit unless otherwise directed. Mobilization   - While recovery time is needed post-operatively, please attempt to get some fresh air outside as tolerated, out of bed/chair mobilization as tolerated using assist devices as prescribed and maintain weight-bearing status as directed by the surgeon in the discharge instructions. Open/close your hand and move your ankle up and down to help circulate the blood and limit swelling. Physical Therapy  - Please contact physical therapy no later than 2 days after your surgery date to schedule your first appointment with them. The first visit with them will likely be an introductory visit between the 3rd -7th postoperative day. Please contact my office at [phone] if there are any questions or concerns. ANESTHESIA DISCHARGE INSTRUCTIONS    You are under the influence of drugs- do not drink alcohol, drive a car, operate machinery(such as power tools, kitchen appliances, etc), sign legal documents, or make any important decisions for 24 hours (or while on pain medications). Children should not ride bikes or [facility] or play on gym sets  for 24 hours after surgery. A responsible adult should be with you for 24 hours. Rest at home today- increase activity as tolerated. Progress slowly to a regular diet unless your physician has instructed you otherwise. Drink plenty of water. CALL YOUR DOCTOR IF YOU:  Have moderate to severe nausea or vomiting AND are unable to hold down fluids or prescribed medications. Have bright red bloody drainage from your dressing that won't stop oozing. Do not get relief with your pain medication    NORMAL (POSSIBLE) SIDE EFFECTS FROM ANESTHESIA:     Confusion, temporary memory loss, delayed reaction times in the first 24 hours  Lightheadedness, dizziness, difficulty focusing, blurred vision  Nausea/vomiting can happen  Shivering, feeling cold, sore throat, cough and muscle aches should stop within 24-48 hours  Trouble urinating - call your surgeon if it has been more than 8 hrs  Bruising or soreness at the IV site - call if it remains red, firm or there is drainage             FEMALES OF CHILDBEARING AGE WHO ARE TAKING BIRTH CONTROL PILLS:  You may have received a medication during your procedure that interferes with the   actions of birth control pills (Bridion or Emend). Use some other kind of birth control in addition to your pills, like a condom, for 1 month after your procedure to prevent unwanted pregnancy.     The following instructions are to be followed if you have a known history or diagnosis of sleep apnea: For all sleep apnea patients:  ? Sleep on your side or sitting up in a chair whenever possible, especially the first 24 hours after surgery. ? Use only medicines prescribed by your doctor. ? Do not drink alcohol. ? If you have a dental device to assist you while at rest, use it at all times for the first 24 hours. For patients using CPAP machines:  ? Use your CPAP machine during all periods of sleep as usual.  ? Use your CPAP machine during all periods of daytime rest while on pain medicines. ** Follow up with your primary care doctor for continued care. IF YOU DO NOT TAKE ALL OF YOUR NARCOTIC PAIN MEDICATION, please dispose of them responsibly. There are drop off boxes in the Emergency Departments 24/7 at both [facility] and [facility]. If these locations are not convenient, other options for discarding them can be found at:  [email or web address]    Hospital or office staff may NOT accept any medications to drop off in the cabinet for you. What is a Surgical Site Infection or  (SSI)? A surgical site infection (SSI) is an infection that occurs after surgery in the part of the body where the surgery took place. Most patients who have surgery do not develop an infection. However, infections can develop in about 1-3 cases for every 100 patients who have had surgery. Our goal is for you to NOT experience any complications and be completely satisfied with your care! However, some signs or symptoms to look for and report immediately to your doctor are:   1. Fever above 101 degrees    2. Redness and increasing pain around the area  where you had surgery   3. Drainage of cloudy fluid or pus coming from the surgical area    Some of the things we/ you can do to prevent SSI's are:   1. Clean hands with soap and water or an alcohol-based hand rub before and after caring for the operative area. This occurs the day of surgery and for the next 2 weeks.    2.Sometimes you receive an appropriate antibiotic within 60 minutes before your surgery or take one for several days after surgery depending on your surgeon's instructions and/or the type of surgery you are having. 3. Family and/or friends who visit you should NOT touch the surgical wound or dressings until advised by your surgeon. 4. Be sure to elevate and decrease the swelling after your surgery to help prevent infection. 5. If you are a diabetic, you need to closely monitor your blood sugar levels and report any significant increases or changes to your surgeon to help promote the healing process.

## 2022-09-23 NOTE — ANESTHESIA PRE PROCEDURE
Department of Anesthesiology  Preprocedure Note       Name:  Riana Prajapati   Age:  41 y.o.  :  1975                                          MRN:  [id number]         Date:  2022      Surgeon: Raz Query):  Ricky Price MD    Procedure: Procedure(s):  VIDEO ARTHROSCOPY LEFT KNEE, MEDIAL MENISCAL ROOT REPAIR, CHONDROPLASTY    Medications prior to admission:   Prior to Admission medications    Medication Sig Start Date End Date Taking?  Authorizing Provider   VITAMIN D PO Take by mouth daily   Yes Historical Provider, MD   omeprazole (PRILOSEC) 40 MG delayed release capsule TAKE ONE CAPSULE BY MOUTH DAILY 22   Historical Provider, MD   ABILIFY 5 MG tablet TAKE ONE TABLET BY MOUTH DAILY 3/22/17   Maria T Arrieta MD   levothyroxine (SYNTHROID) 150 MCG tablet TAKE ONE TABLET BY MOUTH DAILY 3/13/17   Maria T Arrieta MD   escitalopram (LEXAPRO) 20 MG tablet TAKE ONE AND ONE-HALF TABLET BY MOUTH DAILY 16   Maria T Arrieta MD   B Complex Vitamins (VITAMIN B COMPLEX PO) Take by mouth daily    Historical Provider, MD   Ascorbic Acid (VITAMIN C) 500 MG tablet Take 500 mg by mouth daily    Historical Provider, MD       Current medications:    Current Facility-Administered Medications   Medication Dose Route Frequency Provider Last Rate Last Admin    ceFAZolin (ANCEF) 2000 mg in dextrose 5 % 100 mL IVPB  2,000 mg IntraVENous On Call to [address] [place] [address], MD        tranexamic acid (CYKLOKAPRON) injection 1,000 mg  1,000 mg IntraVENous Once Ricky Price MD        tranexamic acid (CYKLOKAPRON) injection 1,000 mg  1,000 mg IntraVENous Once Ricky Price MD        lactated ringers infusion   IntraVENous Continuous Kannan Meyer MD        sodium chloride flush 0.9 % injection 5-40 mL  5-40 mL IntraVENous 2 times per day Kannan Meyer MD        sodium chloride flush 0.9 % injection 5-40 mL  5-40 mL IntraVENous PRN Kannan Meyer MD        0.9 % sodium chloride infusion IntraVENous PRN Mendel Gulling, MD           Allergies: Allergies   Allergen Reactions    Celexa [Citalopram]      Therapeutic Failure     Sulfa Antibiotics        Problem List:    Patient Active Problem List   Diagnosis Code    Chronic LLQ pain R10.32, G89.29    Goiter E04.9    Depression with anxiety F41.8    OA (osteoarthritis) of knee M17.10    DJD (degenerative joint disease) M19.90    Chest pain R07.9    SOB (shortness of breath) on exertion R06.02    Abnormal EKG R94.31    Bilateral carpal tunnel syndrome G56.03       Past Medical History:        Diagnosis Date    Arthritis     knees    Depression     Hypothyroidism        Past Surgical History:        Procedure Laterality Date    CARPAL TUNNEL RELEASE Left     DILATION AND CURETTAGE OF UTERUS         Social History:    Social History     Tobacco Use    Smoking status: Never    Smokeless tobacco: Never   Substance Use Topics    Alcohol use: Yes     Alcohol/week: 14.0 standard drinks     Types: 14 Glasses of wine per week                                Counseling given: Not Answered      Vital Signs (Current):   Vitals:    09/22/22 0817 09/23/22 0830   BP:  103/80   Pulse:  (!) 103   Resp:  17   Temp:  96.8 °F (36 °C)   SpO2:  98%   Weight: 225 lb (102.1 kg)    Height: 5' 4\" (1.626 m)                                               BP Readings from Last 3 Encounters:   09/23/22 103/80   03/09/16 107/66   02/25/16 95/63       NPO Status: Time of last liquid consumption: 2100                        Time of last solid consumption: 2100                        Date of last liquid consumption: 09/22/22                        Date of last solid food consumption: 09/22/22    BMI:   Wt Readings from Last 3 Encounters:   09/22/22 225 lb (102.1 kg)   09/12/22 230 lb (104.3 kg)   08/22/22 230 lb (104.3 kg)     Body mass index is 38.62 kg/m².     CBC:   Lab Results   Component Value Date/Time    WBC 5.8 05/19/2015 10:28 PM    RBC 4.27 05/19/2015 10:28 PM    HGB 13.8 05/19/2015 10:28 PM    HCT 41.7 05/19/2015 10:28 PM    MCV 97.8 05/19/2015 10:28 PM    RDW 13.6 05/19/2015 10:28 PM     05/19/2015 10:28 PM       CMP:   Lab Results   Component Value Date/Time     05/19/2015 10:28 PM    K 4.6 05/19/2015 10:28 PM     05/19/2015 10:28 PM    CO2 23 05/19/2015 10:28 PM    BUN 16 05/19/2015 10:28 PM    CREATININE 0.8 05/19/2015 10:28 PM    GFRAA >60 05/19/2015 10:28 PM    GFRAA >60 04/12/2010 03:40 PM    AGRATIO 2.3 05/19/2015 10:28 PM    LABGLOM >60 05/19/2015 10:28 PM    GLUCOSE 88 05/19/2015 10:28 PM    PROT 7.0 05/19/2015 10:28 PM    PROT 6.8 04/12/2010 03:40 PM    CALCIUM 9.5 05/19/2015 10:28 PM    BILITOT 0.8 05/19/2015 10:28 PM    ALKPHOS 56 05/19/2015 10:28 PM    AST 21 05/19/2015 10:28 PM    ALT 16 05/19/2015 10:28 PM       POC Tests: No results for input(s): POCGLU, POCNA, POCK, POCCL, POCBUN, POCHEMO, POCHCT in the last 72 hours. Coags: No results found for: PROTIME, INR, APTT    HCG (If Applicable):   Lab Results   Component Value Date    PREGTESTUR Negative 09/23/2022        ABGs: No results found for: PHART, PO2ART, PHY8LFB, OGK3TAO, BEART, H8FOFCTE     Type & Screen (If Applicable):  No results found for: LABABO, LABRH    Drug/Infectious Status (If Applicable):  No results found for: HIV, HEPCAB    COVID-19 Screening (If Applicable): No results found for: COVID19        Anesthesia Evaluation  Patient summary reviewed and Nursing notes reviewed  Airway: Mallampati: II  TM distance: >3 FB   Neck ROM: full  Mouth opening: > = 3 FB   Dental: normal exam         Pulmonary:normal exam    (+) shortness of breath:                             Cardiovascular:    (+) MORAN:,                   Neuro/Psych:   (+) neuromuscular disease:, psychiatric history:            GI/Hepatic/Renal: Neg GI/Hepatic/Renal ROS            Endo/Other:    (+) hypothyroidism::., .                 Abdominal:             Vascular: negative vascular ROS.          Other Findings:           Anesthesia Plan      MAC     ASA 2       Induction: intravenous. MIPS: Postoperative opioids intended. Anesthetic plan and risks discussed with patient. Plan discussed with CRNA.     Attending anesthesiologist reviewed and agrees with Preprocedure content                DANNY Gallagher MD   9/23/2022

## 2022-09-23 NOTE — ANESTHESIA PRE PROCEDURE
Department of Anesthesiology  Preprocedure Note       Name:  Edilma Mcdaniels   Age:  41 y.o.  :  1975                                          MRN:  [id number]         Date:  2022      Surgeon: Rosa Duvall):  Parul Kirkland MD    Procedure: Procedure(s):  VIDEO ARTHROSCOPY LEFT KNEE, MEDIAL MENISCAL ROOT REPAIR, CHONDROPLASTY    Medications prior to admission:   Prior to Admission medications    Medication Sig Start Date End Date Taking?  Authorizing Provider   VITAMIN D PO Take by mouth daily   Yes Historical Provider, MD   omeprazole (PRILOSEC) 40 MG delayed release capsule TAKE ONE CAPSULE BY MOUTH DAILY 22   Historical Provider, MD   ABILIFY 5 MG tablet TAKE ONE TABLET BY MOUTH DAILY 3/22/17   Fabi Stanton MD   levothyroxine (SYNTHROID) 150 MCG tablet TAKE ONE TABLET BY MOUTH DAILY 3/13/17   Fabi Stanton MD   escitalopram (LEXAPRO) 20 MG tablet TAKE ONE AND ONE-HALF TABLET BY MOUTH DAILY 16   Fabi Stanton MD   B Complex Vitamins (VITAMIN B COMPLEX PO) Take by mouth daily    Historical Provider, MD   Ascorbic Acid (VITAMIN C) 500 MG tablet Take 500 mg by mouth daily    Historical Provider, MD       Current medications:    Current Facility-Administered Medications   Medication Dose Route Frequency Provider Last Rate Last Admin    ceFAZolin (ANCEF) 2000 mg in dextrose 5 % 100 mL IVPB  2,000 mg IntraVENous On Call to [address] [place] [address] MD        tranexamic acid (CYKLOKAPRON) injection 1,000 mg  1,000 mg IntraVENous Once Parul Kirkland MD        tranexamic acid (CYKLOKAPRON) injection 1,000 mg  1,000 mg IntraVENous Once Parul Kirkland MD        lactated ringers infusion   IntraVENous Continuous Naomi Nuñez MD        sodium chloride flush 0.9 % injection 5-40 mL  5-40 mL IntraVENous 2 times per day Naomi Nuñez MD        sodium chloride flush 0.9 % injection 5-40 mL  5-40 mL IntraVENous PRN Naomi Nuñez MD        0.9 % sodium chloride infusion

## 2022-09-23 NOTE — H&P
ORTHOPAEDIC SURGERY INTERVAL H&P    gS Chan was seen in the preoperative area, where a history and physical examination was reviewed and the patient was examined by me today. There have been no significant clinical changes since the completion of the previous recorded history and physical dated 8/22/22 and 9/12/22. The surgical site was confirmed by the patient and me and the surgical site was marked. The risks, benefits, and alternatives of the proposed procedure(s) have been explained to the patient (or appropriately confirmed guardian) and understanding was verbalized. Please see outpatient notes for details. All questions were answered and a signed documented consent has been placed in the patient's chart. The patient wishes to proceed. On call to the OR. Electronically signed by: Silvestre Oh MD,9/23/2022,9:10 AM         Silvestre Oh MD       Orthopaedic Surgery-Sports Medicine      Disclaimer: This note was dictated with voice recognition software. Though review and correction are routinely performed, please contact the office/medical records for any errors requiring correction.

## 2022-09-23 NOTE — OP NOTE
Operative Note      Patient: Willis Gallegos  YOB: 1975  MRN: [id number]    Date of Procedure: 9/23/2022    Pre-Op Diagnosis: LEFT KNEE MEDIAL MENISCUS ROOT TEAR, PATELLAR CHONDROMALACIA    Post-Op Diagnosis: Same       Procedure: VIDEO ARTHROSCOPY LEFT KNEE, MEDIAL MENISCAL ROOT REPAIR, CHONDROPLASTY    Surgeon(s):  Margarita Rocha MD    Assistant:   Surgical Assistant: Ronni Spencer    Anesthesia: General    Estimated Blood Loss (mL): Minimal    Complications: None    Specimens:   * No specimens in log *    Implants:  Implant Name Type Inv. Item Serial No.  Lot No. LRB No. Used Action   ANCHOR SUTURE BIOCOMP 4.75X19.1 MM [facility] - [id number]  ANCHOR SUTURE BIOCOMP 4.75X19.1 MM [employer] [facility]- [id number] Left 1 Implanted         Drains: * No LDAs found *    Findings: Medial meniscal root tear (roughly 8-10mm from the root with grade II/III changes tibia and grade III changes medial femoral condyle. This root tear was moreso subacute to chronic appearing given lack of injection/ecchymosis and medial compartment findings. There was if any 10% remaining as the remainder was a complex tear at the root and was non-functional. ACL PCL intact. No lateral meniscus tear. Grade III changes patella and Grade II changes trochlea. No gross loose bodies    Detailed Description of Procedure:     OPERATIVE INDICATIONS: Patient is a 41 y.o. female status post  left  knee injury sustaining left knee medial meniscal root tear in setting of chondromalacia. Preoperative subjective symptoms, exam and imaging as applies correlated to meniscal pathology. After appropriate treatment and evaluation, please see outpatient notes for details, a discussion was had with the patient with regard to nonoperative versus operative measures including risks and benefits of each. Understanding these, the patient wished to proceed with surgery.  These risks included were not limited to: damage to nerves, arteries, tendons, veins, infection, bleeding, continued pain, continued disability, need for revision surgery, chondral changes/chondral damage, loss of knee range of motion, stiffness, damage to ligaments causing knee instability potentially requiring bracing, hardware failure, retained broken hardware, retained broken instrumentation, new onset pain, need for additional surgery, painful hardware, need for use of allograft use(with associated risks and benefits discussed with the patient and family), paresthesias, numbness, tingling, venothromboembolism, associated complications with anesthesia, and ultimately death. Understanding these risks, a signed and documented consent was placed in the patients chart. OPERATIVE PROCEDURE: The patient was correctly identified in the preoperative holding area. The left  lower extremity was marked by the surgeon. All questions were answered. Ultimately the patient was transported back to the operating room placed supine on the OR table with all bony prominences well padded. EUA revealed the above findings. left lower extremity was prepped and draped in the standard sterile fashion after the patient received Ancef IV x1 within 60 minutes prior to surgery, a timeout was performed per protocol. Esmarch was used for exsanguination and tourniquet was raised to 250 mm of mercury. Standard anterolateral working portal was made. Scope was introduced. Initial diagnostic arthroscopy performed after also creating an anterior medial portal. Probe was used to probe the patellofemoral comaprtment, the medial and lateral compartments as well as the intercondylar notch. The anticipated pathology of the  posterior root of the medial meniscus was appreciated. The tear had resulted in loss of meniscal radial competance and so a decision was made to repair this. The edge was freshened by shaver.   The medial compartment was relatively tight and so a fenestration on the MCL at the level of the femoral attachment. This nicely provided relaxation of the MCL and for safe introduction of the instrumentation. Meniscal scorpion was used for placement of two luggage tagged sutures just medial to the root tear location. Small incision was made over the pes and using a protected aiming guide, a tunnel was drilled which exited just medial to the root attachment. Sutures were shuttled and tensioned and ultimately placed into a swivel lock device along the proximal medial tibia. Direct visualization of the repair and with probing it, the repair was found to be stable and satisfactory. Gentle chondroplasty was performed of pre-existing loose chondral flaps on the patella and trochlea/medial compartment. The above listed findings were appreciated with regard to grading the cartilage quality in each compartment. All wounds were thoroughly irrigated. Incisions were thoroughly irrigated and pes incision closed deep with 3-0 monocryl as well as arthroscopic portals as needed. Skin was cleaned and dried and arthroscopic incisions were closed with 3-0 nylon in interrupted fashion. 0.25% bupivicaine without epi was used to infiltrate the portal sites. The wounds were dressed with Xeroform, 4 x 4s, and sterile webril. Tourniquet was released at  88 minutes with prompt return of circulation. The  left lower extremity was dressed with an Ace wrap and hinged knee brace without undue tension. The patient was awakened without difficulty and transferred to recovery in stable condition. CONDITION: Stable    DISPOSITION: To PACU, then to home    POST OP PLAN: Patient will follow post op protocol/discharge instructions provided. Patient will follow meniscal repair protocol, TTWB 25% and crutches for 6 weeks and -10 to 90 degrees to be progressed per protocol.       Electronically signed by Mayank Littlejohn MD on 9/23/22 at 1:15 PM EDT      Electronically signed by Mayank Littlejohn MD on 9/23/2022 at 1:10

## 2022-09-29 ENCOUNTER — HOSPITAL ENCOUNTER (OUTPATIENT)
Dept: PHYSICAL THERAPY | Age: 47
Setting detail: THERAPIES SERIES
Discharge: HOME | End: 2022-09-29
Payer: COMMERCIAL

## 2022-09-29 PROCEDURE — 97112 NEUROMUSCULAR REEDUCATION: CPT

## 2022-09-29 PROCEDURE — 97161 PT EVAL LOW COMPLEX 20 MIN: CPT

## 2022-09-29 PROCEDURE — 97110 THERAPEUTIC EXERCISES: CPT

## 2022-09-29 NOTE — PLAN OF CARE
[facility] [place] [address] [place], [address] [facility] [address]  Phone: [phone]   Fax:     [phone]       [facility]    Dear Dr. Dee Porter  ,    We had the pleasure of evaluating the following patient for physical therapy services at [address] [place] [address]. A summary of our findings can be found in the initial assessment below. This includes our plan of care. If you have any questions or concerns regarding these findings, please do not hesitate to contact me at the office phone number checked above.   Thank you for the referral.       Physician Signature:_______________________________Date:__________________  By signing above (or electronic signature), therapists plan is approved by physician      Patient: Bailey Salas   : 1975   MRN: [id number]  Referring Physician:        Evaluation Date: 2022      Medical Diagnosis Information:  Diagnosis: Complex tear of medial meniscus of left knee as current injury S83.232A   Treatment Diagnosis: Left knee pain M25. 562                                         Insurance information: PT Insurance Information: BCBS     Precautions/ Contra-indications:  22 s/p L knee medial meniscus root repair, chondroplasty  Patient will follow meniscal repair protocol, TTWB 25% and crutches for 6 weeks and -10 to 90 degrees to be progressed per protocol    C-SSRS Triggered by Intake questionnaire (Past 2 wk assessment):   [x] No, Questionnaire did not trigger screening.   [] Yes, Patient intake triggered further evaluation      [] C-SSRS Screening completed  [] PCP notified via Plan of Care  [] Emergency services notified     Latex Allergy:  [x]NO      []YES  Preferred Language for Healthcare:   [x]English       []other:    SUBJECTIVE: Patient stated complaint: Pt reported knee popped during [address], MRI + medial meniscus tear. Had sx 9/23/22. Pt arrives to PT eval without brace, stated was not able to get it on after removed wrap. Pt stated did bring brace in the car. Stated has not had big issues with pain. Relevant Medical History: OA, depression  FOTO Score: 36    Pain Scale: 2-6/10  Easing factors: rest  Provocative factors: bending     Type: []Constant   []Intermittent  []Radiating []Localized []other:     Numbness/Tingling: around incisions, anterior shin    Occupation/School: currently desk work    Living Status/Prior Level of Function: Independent with ADLs and IADLs. OBJECTIVE:     ROM LEFT RIGHT   HIP Flex     HIP Abd     HIP Ext     HIP IR     HIP ER     Knee ext 0 0   Knee Flex 90 130   Ankle PF     Ankle DF     Ankle In     Ankle Ev     Strength  LEFT RIGHT   HIP Flexors     HIP Abductors     HIP Ext     Hip ER     Knee EXT (quad) Fair +    Knee Flex (HS)     Ankle DF     Ankle PF     Ankle Inv     Ankle EV          Circumference  Mid apex  7 cm prox             Reflexes/Sensation:    [x]Dermatomes/Myotomes intact    [x]Reflexes equal and normal bilaterally   []Other:    Joint mobility:    []Normal    []Hypo   []Hyper    Palpation: mild TTP post op    Functional Mobility/Transfers: I with transfers    Posture: WFL    Bandages/Dressings/Incisions: no drainage noted    Gait: (include devices/WB status) 25% WB, brace and B axillary crutches    Orthopedic Special Tests:                        [x] Patient history, allergies, meds reviewed. Medical chart reviewed. See intake form. Review Of Systems (ROS):  [x]Performed Review of systems (Integumentary, CardioPulmonary, Neurological) by intake and observation. Intake form has been scanned into medical record. Patient has been instructed to contact their primary care physician regarding ROS issues if not already being addressed at this time.       Co-morbidities/Complexities (which will affect course of rehabilitation):   []None           Arthritic conditions   []Rheumatoid arthritis (M05.9)  [x]Osteoarthritis (M19.91)   Cardiovascular conditions   []Hypertension (I10)  []Hyperlipidemia (E78.5)  []Angina pectoris (I20)  []Atherosclerosis (I70)   Musculoskeletal conditions   []Disc pathology   []Congenital spine pathologies   []Prior surgical intervention  []Osteoporosis (M81.8)  []Osteopenia (M85.8)   Endocrine conditions   []Hypothyroid (E03.9)  []Hyperthyroid Gastrointestinal conditions   []Constipation (M77.18)   Metabolic conditions   []Morbid obesity (E66.01)  []Diabetes type 1(E10.65) or 2 (E11.65)   []Neuropathy (G60.9)     Pulmonary conditions   []Asthma (J45)  []Coughing   []COPD (J44.9)   Psychological Disorders  []Anxiety (F41.9)  [x]Depression (F32.9)   []Other:   []Other:          Barriers to/and or personal factors that will affect rehab potential:              [x]Age  []Sex              []Motivation/Lack of Motivation                        []Co-Morbidities              []Cognitive Function, education/learning barriers              []Environmental, home barriers              []profession/work barriers  [x]past PT/medical experience  []other:  Justification:     Falls Risk Assessment (30 days):   [x] Falls Risk assessed and no intervention required.   [] Falls Risk assessed and Patient requires intervention due to being higher risk   TUG score (>12s at risk):     [] Falls education provided, including       G-Codes:       ASSESSMENT:   Functional Impairments:     []Noted lumbar/proximal hip/LE joint hypomobility   [x]Decreased LE functional ROM   [x]Decreased core/proximal hip strength and neuromuscular control   [x]Decreased LE functional strength   [x]Reduced balance/proprioceptive control   []other:      Functional Activity Limitations (from functional questionnaire and intake)   [x]Reduced ability to tolerate prolonged functional positions   [x]Reduced ability or difficulty with changes of positions or transfers between positions   [x]Reduced ability to maintain good posture and demonstrate good body mechanics with sitting, bending, and lifting   [x]Reduced ability to sleep   [x] Reduced ability or tolerance with driving and/or computer work   [x]Reduced ability to perform lifting, carrying tasks   [x]Reduced ability to squat   [x]Reduced ability to forward bend   [x]Reduced ability to ambulate prolonged functional periods/distances/surfaces   [x]Reduced ability to ascend/descend stairs   [x]Reduced ability to run, hop, cut or jump   []other:    Participation Restrictions   [x]Reduced participation in self care activities   [x]Reduced participation in home management activities   []Reduced participation in work activities   []Reduced participation in social activities. []Reduced participation in sport/recreation activities. Classification :    [x]Signs/symptoms consistent with post-surgical status including decreased ROM, strength and function.    []Signs/symptoms consistent with joint sprain/strain   []Signs/symptoms consistent with patella-femoral syndrome   []Signs/symptoms consistent with knee OA/hip OA   []Signs/symptoms consistent with internal derangement of knee/Hip   []Signs/symptoms consistent with functional hip weakness/NMR control      []Signs/symptoms consistent with tendinitis/tendinosis    []signs/symptoms consistent with pathology which may benefit from Dry needling      []other:      Prognosis/Rehab Potential:      []Excellent   [x]Good    []Fair   []Poor    Tolerance of evaluation/treatment:    []Excellent   [x]Good    []Fair   []Poor    Physical Therapy Evaluation Complexity Justification  [x] A history of present problem with:  [] no personal factors and/or comorbidities that impact the plan of care;  [x]1-2 personal factors and/or comorbidities that impact the plan of care  []3 personal factors and/or comorbidities that impact the plan of care  [x] An examination of body systems using standardized tests and measures addressing any of the following: body structures and functions (impairments), activity limitations, and/or participation restrictions;:  [] a total of 1-2 or more elements   [] a total of 3 or more elements   [x] a total of 4 or more elements   [x] A clinical presentation with:  [x] stable and/or uncomplicated characteristics   [] evolving clinical presentation with changing characteristics  [] unstable and unpredictable characteristics;   [x] Clinical decision making of [x] low, [] moderate, [] high complexity using standardized patient assessment instrument and/or measurable assessment of functional outcome. [x] EVAL (LOW) 03898 (typically 20 minutes face-to-face)  [] EVAL (MOD) 96244 (typically 30 minutes face-to-face)  [] EVAL (HIGH) 60196 (typically 45 minutes face-to-face)  [] RE-EVAL     PLAN:   Frequency/Duration:  1-2 days per week for 12 Weeks:  Interventions:  [x]  Therapeutic exercise including: strength training, ROM, for Lower extremity and core   [x]  NMR activation and proprioception for LE, Glutes and Core   [x]  Manual therapy as indicated for LE, Hip and spine to include: Dry Needling/IASTM, STM, PROM, Gr I-IV mobilizations, manipulation. [x] Modalities as needed that may include: thermal agents, E-stim, Biofeedback, US, iontophoresis as indicated  [x] Patient education on joint protection, postural re-education, activity modification, progression of HEP. HEP instruction: Refer to Cheryle Falcon access code and exercises on the 1st visit treatment note    GOALS:  Patient stated goal: Walking no pain. Therapist goals for Patient:   Short Term Goals: To be achieved in: 2 weeks  1. Independent in HEP and progression per patient tolerance, in order to prevent re-injury. [] Progressing: [] Met: [] Not Met: [] Adjusted     2. Patient will have a decrease in pain to facilitate improvement in movement, function, and ADLs as indicated by Functional Deficits.   [] Progressing: [] Met: [] Not Met: [] Adjusted Long Term Goals: To be achieved in: 12 weeks  1. FOTO score will match or exceed predicted score to assist with reaching prior level of function. [] Progressing: [] Met: [] Not Met: [] Adjusted     2. Patient will demonstrate increased AROM to 0-130 to allow for proper joint functioning as indicated by patients Functional Deficits. [] Progressing: [] Met: [] Not Met: [] Adjusted     3. Patient will demonstrate an increase in Strength to good proximal hip strength and control, within 5lb HHD in LE to allow for proper functional mobility as indicated by patients Functional Deficits. [] Progressing: [] Met: [] Not Met: [] Adjusted     4. Patient will return to functional activities standing/walking 20-30 mins I no AD without increased symptoms or restriction. [] Progressing: [] Met: [] Not Met: [] Adjusted     5.  Ascend/descend 1 flight of steps I no AD (patient specific functional goal)    [] Progressing: [] Met: [] Not Met: [] Adjusted      Electronically signed by:  Shawn Chaves, PT

## 2022-09-29 NOTE — FLOWSHEET NOTE
[facility], [address] [place] [address] [address]  Phone: [phone]   Fax:     [phone]      Physical Therapy Treatment Note/ Progress Report:     Date:  2022    Patient Name:  Pavan Burns    :  1975  MRN: [id number]  Restrictions/Precautions:    Medical/Treatment Diagnosis Information:  Diagnosis: Complex tear of medial meniscus of left knee as current injury S83.232A  Treatment Diagnosis: Left knee pain M25. 336  Insurance/Certification information:  PT Insurance Information: [facility]  Physician Information:   Dr. Cevallos Mail  Has the plan of care been signed (Y/N):        []  Yes  [x]  No     Date of Patient follow up with Physician: 10/3/22    Is this a Progress Report:     []  Yes  [x]  No      If Yes:  Date Range for reporting period:  Beginnin22 ------------ Ending: 10/29/22    Progress report will be due (10 Rx or 30 days whichever is less): 77     Recertification will be due (POC Duration  / 90 days whichever is less): 22      Visit # Insurance Allowable Auth Required   In Person 1 60 []  Yes     []  No    Tele Health 0  []  Yes     []  No    Total 1       FOTO Score: 36     Date assessed:  22      Latex Allergy:  [x]NO      []YES  Preferred Language for Healthcare:   [x]English       []other:    Pain level:  2-6/10     SUBJECTIVE:  See eval    OBJECTIVE: See eval  Observation:   Test measurements:      RESTRICTIONS/PRECAUTIONS: 22 s/p L knee medial meniscus root repair, chondroplasty  Patient will follow meniscal repair protocol, TTWB 25% and crutches for 6 weeks and -10 to 90 degrees to be progressed per protocol    Exercises/Interventions:   Therapeutic Ex (23312) Sets/sec Reps Notes/CUES HEP   Heel prop  1 vc    Calf stretch belt 30s 3 vc    HS stretch long sitting 30s 3 vc    Quad sets 10s 10 vc    SLR brace locked  10 vc    Ankle pumps  10 vc    Heel slides <90 deg  10 vc Gait training  5 min Cues for sequencing and weight bearing precautions                         Pt education 10 min  Reviewed precautions, HEP with gradual progression, goals of PT, not to push through pain with ex, use of RICE principles- pt stated understanding    Manual Intervention ([id number])                                                 NMR re-education (56734)   CUES NEEDED                                                                   Therapeutic Activity (49086)                                          Kwanji access code:  [facility]           Therapeutic Exercise and NMR EXR  [x] (82777) Provided verbal/tactile cueing for activities related to strengthening, flexibility, endurance, ROM for improvements in LE, proximal hip, and core control with self care, mobility, lifting, ambulation. [x] (64842) Provided verbal/tactile cueing for activities related to improving balance, coordination, kinesthetic sense, posture, motor skill, proprioception to assist with LE, proximal hip, and core control in self-care, mobility, lifting, ambulation and eccentric single leg control.      NMR and Therapeutic Activities:    [x] (05657 or 70097) Provided verbal/tactile cueing for activities related to improving balance, coordination, kinesthetic sense, posture, motor skill, proprioception and motor activation to allow for proper function of core, proximal hip and LE with self-care and ADLs and functional mobility.   [] (34289) Gait Re-education- Provided training and instruction to the patient for proper LE, core and proximal hip recruitment and positioning and eccentric body weight control with ambulation re-education including up and down stairs     Home Exercise Program:    [x] (54546) Reviewed/Progressed HEP activities related to strengthening, flexibility, endurance, ROM of core, proximal hip and LE for functional self-care, mobility, lifting and ambulation/stair navigation   [] (62527) Reviewed/Progressed HEP activities related to improving balance, coordination, kinesthetic sense, posture, motor skill, proprioception of core, proximal hip and LE for self-care, mobility, lifting, and ambulation/stair navigation      Manual Treatments:  PROM / STM / Oscillations-Mobs:  G-I, II, III, IV (PA's, Inf., Post.)  [] (54365) Provided manual therapy to mobilize LE, proximal hip and/or LS spine soft tissue/joints for the purpose of modulating pain, promoting relaxation, increasing ROM, reducing/eliminating soft tissue swelling/inflammation/restriction, improving soft tissue extensibility and allowing for proper ROM for normal function with self-care, mobility, lifting and ambulation. Modalities:  at home   [] GAME READY (VASO)- for significant edema, swelling, pain control. Charges:  Timed Code Treatment Minutes: 38   Total Treatment Minutes:  50   BWC:  TE TIME:  NMR TIME:  MANUAL TIME:  UNTIMED MINUTES:  Medicare Total:                 [x] EVAL (LOW) 44594 (typically 20 minutes face-to-face)  [] EVAL (MOD) 62635 (typically 30 minutes face-to-face)  [] EVAL (HIGH) 09706 (typically 45 minutes face-to-face)  [] RE-EVAL     [x] IU(07441) x   2  [] IONTO  [x] NMR (36725) x     [] VASO  [] Manual (95070) x     [] Other:  [] TA x      [] Mech Traction (08609)  [] ES(attended) (15960)      [] ES (un) (72522):    ASSESSMENT:  See eval    GOALS:      Patient stated goal: Walking no pain. Therapist goals for Patient:   Short Term Goals: To be achieved in: 2 weeks  1. Independent in HEP and progression per patient tolerance, in order to prevent re-injury. [] Progressing: [] Met: [] Not Met: [] Adjusted     2. Patient will have a decrease in pain to facilitate improvement in movement, function, and ADLs as indicated by Functional Deficits. [] Progressing: [] Met: [] Not Met: [] Adjusted     Long Term Goals: To be achieved in: 12 weeks  1.  FOTO score will match or exceed predicted score to assist with reaching prior level of function. [] Progressing: [] Met: [] Not Met: [] Adjusted     2. Patient will demonstrate increased AROM to 0-130 to allow for proper joint functioning as indicated by patients Functional Deficits. [] Progressing: [] Met: [] Not Met: [] Adjusted     3. Patient will demonstrate an increase in Strength to good proximal hip strength and control, within 5lb HHD in LE to allow for proper functional mobility as indicated by patients Functional Deficits. [] Progressing: [] Met: [] Not Met: [] Adjusted     4. Patient will return to functional activities standing/walking 20-30 mins I no AD without increased symptoms or restriction. [] Progressing: [] Met: [] Not Met: [] Adjusted     5. Ascend/descend 1 flight of steps I no AD (patient specific functional goal)    [] Progressing: [] Met: [] Not Met: [] Adjusted          Overall Progression Towards Functional goals/ Treatment Progress Update:  [] Patient is progressing as expected towards functional goals listed. [] Progression is slowed due to complexities/Impairments listed. [] Progression has been slowed due to co-morbidities.   [x] Plan just implemented, too soon to assess goals progression <30days   [] Goals require adjustment due to lack of progress  [] Patient is not progressing as expected and requires additional follow up with physician  [] Other    Prognosis for POC: [x] Good [] Fair  [] Poor      Patient requires continued skilled intervention: [x] Yes  [] No    Treatment/Activity Tolerance:  [x] Patient able to complete treatment  [] Patient limited by fatigue  [] Patient limited by pain    [] Patient limited by other medical complications  [] Other:     Return to Play: (if applicable)   []  Stage 1: Intro to Strength   []  Stage 2: Return to Run and Strength   []  Stage 3: Return to Jump and Strength   []  Stage 4: Dynamic Strength and Agility   []  Stage 5: Sport Specific Training     []  Ready to Return to Play, Meets All Above Stages   []  Not Ready for Return to Sports   Comments:                          PLAN: See eval  [] Continue per plan of care [] Alter current plan (see comments above)  [x] Plan of care initiated [] Hold pending MD visit [] Discharge    Electronically signed by:  Mitesh Smith PT    Note: If patient does not return for scheduled/ recommended follow up visits, this note will serve as a discharge from care along with most recent update on progress.

## 2022-10-03 ENCOUNTER — HOSPITAL ENCOUNTER (OUTPATIENT)
Dept: PHYSICAL THERAPY | Age: 47
Setting detail: THERAPIES SERIES
Discharge: HOME | End: 2022-10-03
Payer: COMMERCIAL

## 2022-10-03 ENCOUNTER — APPOINTMENT (OUTPATIENT)
Dept: PHYSICAL THERAPY | Age: 47
End: 2022-10-03
Payer: COMMERCIAL

## 2022-10-03 ENCOUNTER — OFFICE VISIT (OUTPATIENT)
Dept: ORTHOPEDIC SURGERY | Age: 47
End: 2022-10-03

## 2022-10-03 VITALS — HEIGHT: 64 IN | WEIGHT: 225 LBS | BODY MASS INDEX: 38.41 KG/M2

## 2022-10-03 DIAGNOSIS — Z47.89 ORTHOPEDIC AFTERCARE: Primary | ICD-10-CM

## 2022-10-03 PROCEDURE — 97016 VASOPNEUMATIC DEVICE THERAPY: CPT

## 2022-10-03 PROCEDURE — 99024 POSTOP FOLLOW-UP VISIT: CPT | Performed by: ORTHOPAEDIC SURGERY

## 2022-10-03 PROCEDURE — 97112 NEUROMUSCULAR REEDUCATION: CPT

## 2022-10-03 PROCEDURE — 97110 THERAPEUTIC EXERCISES: CPT

## 2022-10-03 NOTE — FLOWSHEET NOTE
[facility], [address] [place] [address], [address] [place] [address] [address]  Phone: [phone]   Fax:     [phone]      Physical Therapy Treatment Note/ Progress Report:     Date:  2022    Patient Name:  Marysol Piña    :  1975  MRN: [id number]  Restrictions/Precautions:    Medical/Treatment Diagnosis Information:  Diagnosis: Complex tear of medial meniscus of left knee as current injury S83.232A  Treatment Diagnosis: Left knee pain M25. 524  Insurance/Certification information:  PT Insurance Information: [facility]  Physician Information:   Dr. Hinda Dandy  Has the plan of care been signed (Y/N):        []  Yes  [x]  No     Date of Patient follow up with Physician: 10/3/22    Is this a Progress Report:     []  Yes  [x]  No      If Yes:  Date Range for reporting period:  Beginnin22 ------------ Ending: 10/29/22    Progress report will be due (10 Rx or 30 days whichever is less):      Recertification will be due (POC Duration  / 90 days whichever is less): 22      Visit # Insurance Allowable Auth Required   In Person 2 60 []  Yes     []  No    Tele Health 0  []  Yes     []  No    Total 2       FOTO Score: 36     Date assessed:  22      Latex Allergy:  [x]NO      []YES  Preferred Language for Healthcare:   [x]English       []other:    Pain level:  2-6/10     SUBJECTIVE:  Pt reports no complaints. Stated compliance with HEP.     OBJECTIVE: See eval  Observation:   Test measurements:      RESTRICTIONS/PRECAUTIONS: 22 s/p L knee medial meniscus root repair, chondroplasty  Patient will follow meniscal repair protocol, TTWB 25% and crutches for 6 weeks and -10 to 90 degrees to be progressed per protocol    Exercises/Interventions:   Therapeutic Ex (94222) Sets/sec Reps Notes/CUES HEP   Heel prop  1 vc    Calf stretch belt 30s 3 vc    HS stretch long sitting 30s 3 vc    Quad sets 10s 10 vc    SLR brace locked  sslr  30  3x10 vc Ankle pumps  30 Silver TB , vc    Heel slides <90 deg  10 vc    Gait training  5 min Cues for sequencing and weight bearing precautions                         Pt education 10 min  Reviewed precautions, HEP with gradual progression, goals of PT, not to push through pain with ex, use of RICE principles- pt stated understanding    Manual Intervention ([id number])                                                 NMR re-education (52870)   CUES NEEDED                                                                   Therapeutic Activity (16656)                                          [employer] access code:  [facility]           Therapeutic Exercise and NMR EXR  [x] (91841) Provided verbal/tactile cueing for activities related to strengthening, flexibility, endurance, ROM for improvements in LE, proximal hip, and core control with self care, mobility, lifting, ambulation. [x] (08897) Provided verbal/tactile cueing for activities related to improving balance, coordination, kinesthetic sense, posture, motor skill, proprioception to assist with LE, proximal hip, and core control in self-care, mobility, lifting, ambulation and eccentric single leg control.      NMR and Therapeutic Activities:    [x] (88235 or 61020) Provided verbal/tactile cueing for activities related to improving balance, coordination, kinesthetic sense, posture, motor skill, proprioception and motor activation to allow for proper function of core, proximal hip and LE with self-care and ADLs and functional mobility.   [] (62697) Gait Re-education- Provided training and instruction to the patient for proper LE, core and proximal hip recruitment and positioning and eccentric body weight control with ambulation re-education including up and down stairs     Home Exercise Program:    [x] (39205) Reviewed/Progressed HEP activities related to strengthening, flexibility, endurance, ROM of core, proximal hip and LE for functional self-care, mobility, lifting and ambulation/stair navigation   [] (73336) Reviewed/Progressed HEP activities related to improving balance, coordination, kinesthetic sense, posture, motor skill, proprioception of core, proximal hip and LE for self-care, mobility, lifting, and ambulation/stair navigation      Manual Treatments:  PROM / STM / Oscillations-Mobs:  G-I, II, III, IV (PA's, Inf., Post.)  [] (24006) Provided manual therapy to mobilize LE, proximal hip and/or LS spine soft tissue/joints for the purpose of modulating pain, promoting relaxation, increasing ROM, reducing/eliminating soft tissue swelling/inflammation/restriction, improving soft tissue extensibility and allowing for proper ROM for normal function with self-care, mobility, lifting and ambulation. Modalities:  at home   [] GAME READY (VASO)- for significant edema, swelling, pain control. Charges:  Timed Code Treatment Minutes: 25   Total Treatment Minutes:  35   BWC:  TE TIME:  NMR TIME:  MANUAL TIME:  UNTIMED MINUTES:  Medicare Total:                 [] EVAL (LOW) 98734 (typically 20 minutes face-to-face)  [] EVAL (MOD) 61595 (typically 30 minutes face-to-face)  [] EVAL (HIGH) 60653 (typically 45 minutes face-to-face)  [] RE-EVAL     [x] AJ(30998) x     [] IONTO  [x] NMR (80092) x     [x] VASO  [] Manual (82747) x     [] Other:  [] TA x      [] Mech Traction (99939)  [] ES(attended) (64562)      [] ES (un) (67472):    ASSESSMENT:  No complaints throughout. Maintained ROM. GOALS:      Patient stated goal: Walking no pain. Therapist goals for Patient:   Short Term Goals: To be achieved in: 2 weeks  1. Independent in HEP and progression per patient tolerance, in order to prevent re-injury. [] Progressing: [] Met: [] Not Met: [] Adjusted     2. Patient will have a decrease in pain to facilitate improvement in movement, function, and ADLs as indicated by Functional Deficits. [] Progressing: [] Met: [] Not Met: [] Adjusted     Long Term Goals:  To be achieved in: 12 weeks  1. FOTO score will match or exceed predicted score to assist with reaching prior level of function. [] Progressing: [] Met: [] Not Met: [] Adjusted     2. Patient will demonstrate increased AROM to 0-130 to allow for proper joint functioning as indicated by patients Functional Deficits. [] Progressing: [] Met: [] Not Met: [] Adjusted     3. Patient will demonstrate an increase in Strength to good proximal hip strength and control, within 5lb HHD in LE to allow for proper functional mobility as indicated by patients Functional Deficits. [] Progressing: [] Met: [] Not Met: [] Adjusted     4. Patient will return to functional activities standing/walking 20-30 mins I no AD without increased symptoms or restriction. [] Progressing: [] Met: [] Not Met: [] Adjusted     5. Ascend/descend 1 flight of steps I no AD (patient specific functional goal)    [] Progressing: [] Met: [] Not Met: [] Adjusted          Overall Progression Towards Functional goals/ Treatment Progress Update:  [] Patient is progressing as expected towards functional goals listed. [] Progression is slowed due to complexities/Impairments listed. [] Progression has been slowed due to co-morbidities.   [x] Plan just implemented, too soon to assess goals progression <30days   [] Goals require adjustment due to lack of progress  [] Patient is not progressing as expected and requires additional follow up with physician  [] Other    Prognosis for POC: [x] Good [] Fair  [] Poor      Patient requires continued skilled intervention: [x] Yes  [] No    Treatment/Activity Tolerance:  [x] Patient able to complete treatment  [] Patient limited by fatigue  [] Patient limited by pain    [] Patient limited by other medical complications  [] Other:     Return to Play: (if applicable)   []  Stage 1: Intro to Strength   []  Stage 2: Return to Run and Strength   []  Stage 3: Return to Jump and Strength   []  Stage 4: Dynamic Strength and Agility   []  Stage 5: Sport Specific Training     []  Ready to Return to Play, [email or web address]   []  Not Ready for Return to Sports   Comments:                          PLAN: See eval  [x] Continue per plan of care [] Alter current plan (see comments above)  [] Plan of care initiated [] Hold pending MD visit [] Discharge    Electronically signed by:  Maikol Tyson PT    Note: If patient does not return for scheduled/ recommended follow up visits, this note will serve as a discharge from care along with most recent update on progress.

## 2022-10-03 NOTE — PROGRESS NOTES
POST OPERATIVE ORTHOPAEDIC NOTE    DOS: 9/23/2022  PROCEDURES: Left knee arthroscopic posterior medial meniscal root repair and chondroplasty    The patient has been recovering. The patient states the pain is 0-1/10 pain. The patient has started PT. patient's been continued partial weightbearing with use of crutches. She comes accompanied by her . She has been occasionally taking more weightbearing steps without crutches at home it would appear    Focused pertinent physical examination of the operative extremity:  Wounds C/D/I, well healing surgical incisions  No erythema or drainage  No swelling in the leg  Skin intact throughout  5/5 IP Q H TA G EHL  SILT DP SP LP MP S S  +2 DP pulse     Diagnosis Orders   1. Orthopedic aftercare          Assessment and plan: The patient is now 10 days status post the above listed procedure and is recovering    . --Greater than 50% of the time (11/20 minutes) was spent coordinating care and discussing postoperative recovery course  -I had a pleasant discussion with the patient and her  who accompanies her. I reviewed with them both intraoperative procedures performed and arthroscopic photos. -Sutures were removed and Steri-Strips were placed.  -Patient was educated as to scar tissue massage. -OTC Tylenol per bottle as needed discomfort. Patient will also continue enteric-coated aspirin 325 mg tab p.o. daily x3 weeks postoperatively for DVT/VTE prophylaxis  -She will continue formal physical therapy and they will continue a medial meniscus repair protocol. I would anticipate 25% partial weightbearing for the first 6 weeks and then at the 6-week point they can unlock her brace to work on full range of motion and advance weightbearing as tolerated to 2 crutch to 1 crutch 1 crutch to no crutch and then out of the brace once satisfactory quadriceps and hamstring control is present.   Otherwise at this time her brace is locked at -10 to 90 degrees to allow for

## 2022-10-10 ENCOUNTER — HOSPITAL ENCOUNTER (OUTPATIENT)
Dept: PHYSICAL THERAPY | Age: 47
Setting detail: THERAPIES SERIES
Discharge: HOME | End: 2022-10-10
Payer: COMMERCIAL

## 2022-10-10 PROCEDURE — 97016 VASOPNEUMATIC DEVICE THERAPY: CPT

## 2022-10-10 PROCEDURE — 97112 NEUROMUSCULAR REEDUCATION: CPT

## 2022-10-10 PROCEDURE — 97110 THERAPEUTIC EXERCISES: CPT

## 2022-10-10 NOTE — FLOWSHEET NOTE
[facility], [address] [place] [address], [address] [place] [address] [address]  Phone: [phone]   Fax:     [phone]      Physical Therapy Treatment Note/ Progress Report:     Date:  2022    Patient Name:  Federico Burton    :  1975  MRN: [id number]  Restrictions/Precautions:    Medical/Treatment Diagnosis Information:  Diagnosis: Complex tear of medial meniscus of left knee as current injury S83.232A  Treatment Diagnosis: Left knee pain M25. 325  Insurance/Certification information:  PT Insurance Information: [facility]  Physician Information:   Dr. Robbie Gates  Has the plan of care been signed (Y/N):        []  Yes  [x]  No     Date of Patient follow up with Physician: 10/3/22    Is this a Progress Report:     []  Yes  [x]  No      If Yes:  Date Range for reporting period:  Beginnin22 ------------ Ending: 10/29/22    Progress report will be due (10 Rx or 30 days whichever is less):      Recertification will be due (POC Duration  / 90 days whichever is less): 22      Visit # Insurance Allowable Auth Required   In Person 3 60 []  Yes     []  No    [facility] 0  []  Yes     []  No    Total 3       FOTO Score: 36     Date assessed:  22      Latex Allergy:  [x]NO      []YES  Preferred Language for Healthcare:   [x]English       []other:    Pain level:  2-6/10     SUBJECTIVE:  Pt reports has not been wearing brace due to unable to keep it from falling down. Stated has also been utilizing one crutch at work to get around non compliance with her weight bearing precautions. Stated has had some soreness, denies significant pain. Pt reports needs 1x/wk with PT due to her work schedule.      OBJECTIVE: See eval  Observation:   Test measurements:      RESTRICTIONS/PRECAUTIONS: 22 s/p L knee medial meniscus root repair, chondroplasty  Patient will follow meniscal repair protocol, TTWB 25% and crutches for 6 weeks and -10 to 90 degrees to be progressed per protocol    Exercises/Interventions:   Therapeutic Ex (07716) Sets/sec Reps Notes/CUES HEP   Heel prop  1 vc    Calf stretch belt 30s 3 vc    HS stretch long sitting 30s 3 vc    Quad sets 10s 10 vc    SLR brace locked  sslr  30  3x10 vc    Ankle pumps  30 Silver TB , vc    Heel slides <90 deg  10 vc    Gait training  5 min Cues for sequencing and weight bearing precautions    Leg extensions  NV Within ROM    Prone hang    Standing hip abd/ext  1    LLE only            Pt education 10 min  Reviewed precautions, HEP with gradual progression, goals of PT, not to push through pain with ex, use of RICE principles- pt stated understanding    Manual Intervention ([id number])                                                 NMR re-education (76748)   CUES NEEDED                                                                   Therapeutic Activity (33278)                                          [employer] [facility]           Therapeutic Exercise and NMR EXR  [x] (74453) Provided verbal/tactile cueing for activities related to strengthening, flexibility, endurance, ROM for improvements in LE, proximal hip, and core control with self care, mobility, lifting, ambulation. [x] (21226) Provided verbal/tactile cueing for activities related to improving balance, coordination, kinesthetic sense, posture, motor skill, proprioception to assist with LE, proximal hip, and core control in self-care, mobility, lifting, ambulation and eccentric single leg control.      NMR and Therapeutic Activities:    [x] (16729 or 16766) Provided verbal/tactile cueing for activities related to improving balance, coordination, kinesthetic sense, posture, motor skill, proprioception and motor activation to allow for proper function of core, proximal hip and LE with self-care and ADLs and functional mobility.   [] (54400) Gait Re-education- Provided training and instruction to the patient for proper LE, core and proximal hip recruitment and positioning and eccentric body weight control with ambulation re-education including up and down stairs     Home Exercise Program:    [x] (99513) Reviewed/Progressed HEP activities related to strengthening, flexibility, endurance, ROM of core, proximal hip and LE for functional self-care, mobility, lifting and ambulation/stair navigation   [] (70065) Reviewed/Progressed HEP activities related to improving balance, coordination, kinesthetic sense, posture, motor skill, proprioception of core, proximal hip and LE for self-care, mobility, lifting, and ambulation/stair navigation      Manual Treatments:  PROM / STM / Oscillations-Mobs:  G-I, II, III, IV (PA's, Inf., Post.)  [] (16462) Provided manual therapy to mobilize LE, proximal hip and/or LS spine soft tissue/joints for the purpose of modulating pain, promoting relaxation, increasing ROM, reducing/eliminating soft tissue swelling/inflammation/restriction, improving soft tissue extensibility and allowing for proper ROM for normal function with self-care, mobility, lifting and ambulation. Modalities:  at home   [] GAME READY (VASO)- for significant edema, swelling, pain control. Charges:  Timed Code Treatment Minutes: 40   Total Treatment Minutes:  50   BWC:  TE TIME:  NMR TIME:  MANUAL TIME:  UNTIMED MINUTES:  Medicare Total:                 [] EVAL (LOW) 41429 (typically 20 minutes face-to-face)  [] EVAL (MOD) 86484 (typically 30 minutes face-to-face)  [] EVAL (HIGH) 49042 (typically 45 minutes face-to-face)  [] RE-EVAL     [x] CG(12799) x    2 [] IONTO  [x] NMR (54458) x     [x] VASO  [] Manual (86954) x     [] Other:  [] TA x      [] Mech Traction (57338)  [] ES(attended) (77545)      [] ES (un) (30007):    ASSESSMENT:  Pt reports has not been compliant with her WB precautions, brace wear. Reviewed and stressed precautions and importance of pt compliance- pt stated understanding. GOod tolerance of her HEP and above ex.  Pt plans to try silicone strips to assist with brace. GOALS:      Patient stated goal: Walking no pain. Therapist goals for Patient:   Short Term Goals: To be achieved in: 2 weeks  1. Independent in HEP and progression per patient tolerance, in order to prevent re-injury. [] Progressing: [] Met: [] Not Met: [] Adjusted     2. Patient will have a decrease in pain to facilitate improvement in movement, function, and ADLs as indicated by Functional Deficits. [] Progressing: [] Met: [] Not Met: [] Adjusted     Long Term Goals: To be achieved in: 12 weeks  1. FOTO score will match or exceed predicted score to assist with reaching prior level of function. [] Progressing: [] Met: [] Not Met: [] Adjusted     2. Patient will demonstrate increased AROM to 0-130 to allow for proper joint functioning as indicated by patients Functional Deficits. [] Progressing: [] Met: [] Not Met: [] Adjusted     3. Patient will demonstrate an increase in Strength to good proximal hip strength and control, within 5lb HHD in LE to allow for proper functional mobility as indicated by patients Functional Deficits. [] Progressing: [] Met: [] Not Met: [] Adjusted     4. Patient will return to functional activities standing/walking 20-30 mins I no AD without increased symptoms or restriction. [] Progressing: [] Met: [] Not Met: [] Adjusted     5. Ascend/descend 1 flight of steps I no AD (patient specific functional goal)    [] Progressing: [] Met: [] Not Met: [] Adjusted          Overall Progression Towards Functional goals/ Treatment Progress Update:  [] Patient is progressing as expected towards functional goals listed. [] Progression is slowed due to complexities/Impairments listed. [] Progression has been slowed due to co-morbidities.   [x] Plan just implemented, too soon to assess goals progression <30days   [] Goals require adjustment due to lack of progress  [] Patient is not progressing as expected and requires additional follow up with physician  [] Other    Prognosis for POC: [x] Good [] Fair  [] Poor      Patient requires continued skilled intervention: [x] Yes  [] No    Treatment/Activity Tolerance:  [x] Patient able to complete treatment  [] Patient limited by fatigue  [] Patient limited by pain    [] Patient limited by other medical complications  [] Other:     Return to Play: (if applicable)   []  Stage 1: Intro to Strength   []  Stage 2: Return to Run and Strength   []  Stage 3: Return to Jump and Strength   []  Stage 4: Dynamic Strength and Agility   []  Stage 5: Sport Specific Training     []  Ready to Return to Play, Meets All Above Stages   []  Not Ready for Return to Sports   Comments:                          PLAN: See eval  [x] Continue per plan of care [] Alter current plan (see comments above)  [] Plan of care initiated [] Hold pending MD visit [] Discharge    Electronically signed by:  Michael Workman PT    Note: If patient does not return for scheduled/ recommended follow up visits, this note will serve as a discharge from care along with most recent update on progress.

## 2022-10-17 ENCOUNTER — HOSPITAL ENCOUNTER (OUTPATIENT)
Dept: PHYSICAL THERAPY | Age: 47
Setting detail: THERAPIES SERIES
Discharge: HOME | End: 2022-10-17
Payer: COMMERCIAL

## 2022-10-17 NOTE — FLOWSHEET NOTE
[facility]23 [facility], [address] [place] [address], [address] [place] [address] [address]  Phone: [phone]   Fax:     [phone]    Physical Therapy  Cancellation/No-show Note  Patient Name:  Axel Roberts  :  1975   Date:  10/17/2022    Cancelled visits to date: 1  No-shows to date: 0    For today's appointment patient:  [x]  Cancelled  []  Rescheduled appointment  []  No-show     Reason given by patient:  []  Patient ill  []  Conflicting appointment  []  No transportation    [x]  Conflict with work  []  No reason given  []  Other:     Comments:      Phone call information:   []  Phone call made today to patient at am/pm at the number provided:      []  Patient answered, conversation as follows:    []  Patient did not answer, message left as follows:  [x]  Phone call not needed - pt contacted us to cancel and provided reason for cancellation.      Electronically signed by:  Hubert Mary PT, PT

## 2022-10-24 ENCOUNTER — HOSPITAL ENCOUNTER (OUTPATIENT)
Dept: PHYSICAL THERAPY | Age: 47
Setting detail: THERAPIES SERIES
Discharge: HOME | End: 2022-10-24
Payer: COMMERCIAL

## 2022-10-24 PROCEDURE — 97112 NEUROMUSCULAR REEDUCATION: CPT

## 2022-10-24 PROCEDURE — 97110 THERAPEUTIC EXERCISES: CPT

## 2022-10-24 PROCEDURE — 97016 VASOPNEUMATIC DEVICE THERAPY: CPT

## 2022-10-24 NOTE — PROGRESS NOTES
[address] [place] [address] and [facility], [address], [address] [place] [address] [address]  Phone: [phone]   Fax:     [phone]      Physical Therapy Treatment Note/ Progress Report:     Date: 10/24/2022          Patient Name; :  Jessie Soto; 1975   Dx:   Complex tear of medial meniscus of left knee as current injury S80.12A     Physician:  Dr. Cherisse Denver        Total PT Visits: 4     Measures Previous Current   Pain (0-10) 2-6 0-2   Disability  36 61   ROM 0-90 0-90 (115 pain free)             Strength                 Specific Functional Improvements & Impressions:  Pt progressing well within protocol. Pt reports has improved compliance with her WB and brace wear. Stated minimal to no pain recently. Plan & Recommendations:  [x] Continue rehabilitation due to objective improvement and continued functional deficits with frequency and duration: 1-2x/wk 8 wks  [] Progress toward  []GAP, []Work Conditioning, []Independent HEP   [] Discharge due to   [] All goals achieved, [] Maximized \"medical necessity\" [] No subjective or objective improvements      Electronically signed by:  Regine Beard, PT  Therapy Plan of Care Re-Certification  This patient has been re-evaluated for physical therapy services and for therapy to continue, Medicare, Medicaid and other insurances require periodic physician review of the treatment plan.  Please review the above re-evaluation and verify that you agree with plan of care as established above by signing the attached document and return it to our office or note changes to established plan below  [] Follow treatment plan as above [] Discontinue physical therapy  [] Change plan to:                                 __________________________________________________    Physician Signature:____________________________________ Date:____________  By signing above, therapists plan is approved by physician    If you have any questions or concerns, please don't hesitate to call. Thank you for your referral.     Date:  2022    Patient Name:  Fabian Jarrett    :  1975  MRN: [id number]  Restrictions/Precautions:    Medical/Treatment Diagnosis Information:  Diagnosis: Complex tear of medial meniscus of left knee as current injury S83.232A  Treatment Diagnosis: Left knee pain M25. 839  Insurance/Certification information:  PT Insurance Information: [facility]  Physician Information:   Dr. Madhu Dueñas  Has the plan of care been signed (Y/N):        []  Yes  [x]  No     Date of Patient follow up with Physician: 10/3/22    Is this a Progress Report:     []  Yes  [x]  No      If Yes:  Date Range for reporting period:  Beginnin22 ------------ Ending: 10/29/22    Progress report will be due (10 Rx or 30 days whichever is less):      Recertification will be due (POC Duration  / 90 days whichever is less): 22      Visit # Insurance Allowable Auth Required   In Person 4 60 []  Yes     []  No    Tele Health 0  []  Yes     []  No    Total 4       FOTO Score: 36     Date assessed:         Latex Allergy:  [x]NO      []YES  Preferred Language for Healthcare:   [x]English       []other:    Pain level:  0-2/10     SUBJECTIVE:  Pt 4 wks PO. Reports knee is feeling better has not had much pain. Reports she has been compliant with wearing her knee brace. Pt reports needs 1x/wk with PT due to her work schedule.      OBJECTIVE: See eval  Observation:   Test measurements:      RESTRICTIONS/PRECAUTIONS: 22 s/p L knee medial meniscus root repair, chondroplasty  Patient will follow meniscal repair protocol, TTWB 25% and crutches for 6 weeks and -10 to 90 degrees to be progressed per protocol    Exercises/Interventions:   Therapeutic Ex (11576) Sets/sec Reps Notes/CUES HEP   Heel prop- supine  6min vc    Calf stretch belt 30s 3 vc    HS stretch long sitting 30s 3 vc    Quad sets 10s 10 vc    SLR SSLR  3x15  3x15 vc    Ankle pumps  30 Silver TB , vc    Heel slides <90 deg  10 vc    Gait training  5 min Cues for sequencing and weight bearing precautions    Leg extensions 90-40  NV Within ROM    Prone hang    Standing hip abd/ext  1    3x10            Pt education 10 min  Reviewed precautions, HEP with gradual progression, goals of PT, not to push through pain with ex, use of RICE principles- pt stated understanding    Manual Intervention ([id number])                                                 NMR re-education (71111)   CUES NEEDED                                                                   Therapeutic Activity (09596)                                          [employer] access code:  [facility]           Therapeutic Exercise and NMR EXR  [x] (15240) Provided verbal/tactile cueing for activities related to strengthening, flexibility, endurance, ROM for improvements in LE, proximal hip, and core control with self care, mobility, lifting, ambulation. [x] (22521) Provided verbal/tactile cueing for activities related to improving balance, coordination, kinesthetic sense, posture, motor skill, proprioception to assist with LE, proximal hip, and core control in self-care, mobility, lifting, ambulation and eccentric single leg control.      NMR and Therapeutic Activities:    [x] (98871 or 16941) Provided verbal/tactile cueing for activities related to improving balance, coordination, kinesthetic sense, posture, motor skill, proprioception and motor activation to allow for proper function of core, proximal hip and LE with self-care and ADLs and functional mobility.   [] (25657) Gait Re-education- Provided training and instruction to the patient for proper LE, core and proximal hip recruitment and positioning and eccentric body weight control with ambulation re-education including up and down stairs     Home Exercise Program:    [x] (44886) Reviewed/Progressed HEP activities related to strengthening, flexibility, endurance, ROM of core, proximal hip and LE for functional self-care, mobility, lifting and ambulation/stair navigation   [] (47507) Reviewed/Progressed HEP activities related to improving balance, coordination, kinesthetic sense, posture, motor skill, proprioception of core, proximal hip and LE for self-care, mobility, lifting, and ambulation/stair navigation      Manual Treatments:  PROM / STM / Oscillations-Mobs:  G-I, II, III, IV (PA's, Inf., Post.)  [] (44881) Provided manual therapy to mobilize LE, proximal hip and/or LS spine soft tissue/joints for the purpose of modulating pain, promoting relaxation, increasing ROM, reducing/eliminating soft tissue swelling/inflammation/restriction, improving soft tissue extensibility and allowing for proper ROM for normal function with self-care, mobility, lifting and ambulation. Modalities:  at home   [x] GAME READY (VASO)- for significant edema, swelling, pain control. Charges:  Timed Code Treatment Minutes: 40   Total Treatment Minutes:  50   BWC:  TE TIME:  NMR TIME:  MANUAL TIME:  UNTIMED MINUTES:  Medicare Total:                 [] EVAL (LOW) 13609 (typically 20 minutes face-to-face)  [] EVAL (MOD) 52541 (typically 30 minutes face-to-face)  [] EVAL (HIGH) 15461 (typically 45 minutes face-to-face)  [] RE-EVAL     [x] RM(13410) x    2 [] IONTO  [x] NMR (18846) x     [x] VASO  [] Manual (99659) x     [] Other:  [] TA x      [] Mech Traction (56115)  [] ES(attended) (84330)      [] ES (un) (27425):    ASSESSMENT:  See above. GOALS:      Patient stated goal: Walking no pain. Therapist goals for Patient:   Short Term Goals: To be achieved in: 2 weeks  1. Independent in HEP and progression per patient tolerance, in order to prevent re-injury. [x] Progressing: [] Met: [] Not Met: [] Adjusted     2. Patient will have a decrease in pain to facilitate improvement in movement, function, and ADLs as indicated by Functional Deficits. [x] Progressing: [] Met: [] Not Met: [] Adjusted     Long Term Goals:  To be achieved in: 12 weeks  1. FOTO score will match or exceed predicted score to assist with reaching prior level of function. [x] Progressing: [] Met: [] Not Met: [] Adjusted     2. Patient will demonstrate increased AROM to 0-130 to allow for proper joint functioning as indicated by patients Functional Deficits. [x] Progressing: [] Met: [] Not Met: [] Adjusted     3. Patient will demonstrate an increase in Strength to good proximal hip strength and control, within 5lb HHD in LE to allow for proper functional mobility as indicated by patients Functional Deficits. [x] Progressing: [] Met: [] Not Met: [] Adjusted     4. Patient will return to functional activities standing/walking 20-30 mins I no AD without increased symptoms or restriction. [x] Progressing: [] Met: [] Not Met: [] Adjusted     5. Ascend/descend 1 flight of steps I no AD (patient specific functional goal)    [x] Progressing: [] Met: [] Not Met: [] Adjusted          Overall Progression Towards Functional goals/ Treatment Progress Update:  [x] Patient is progressing as expected towards functional goals listed. [] Progression is slowed due to complexities/Impairments listed. [] Progression has been slowed due to co-morbidities.   [] Plan just implemented, too soon to assess goals progression <30days   [] Goals require adjustment due to lack of progress  [] Patient is not progressing as expected and requires additional follow up with physician  [] Other    Prognosis for POC: [x] Good [] Fair  [] Poor      Patient requires continued skilled intervention: [x] Yes  [] No    Treatment/Activity Tolerance:  [x] Patient able to complete treatment  [] Patient limited by fatigue  [] Patient limited by pain    [] Patient limited by other medical complications  [] Other:     Return to Play: (if applicable)   []  Stage 1: Intro to Strength   []  Stage 2: Return to Run and Strength   []  Stage 3: Return to Jump and Strength   []  Stage 4: Dynamic Strength and Agility   []  Stage 5: Sport Specific Training     []  Ready to Return to Play, [employer]   []  Not Ready for Return to Sports   Comments:                          PLAN: See eval  [x] Continue per plan of care [] Alter current plan (see comments above)  [] Plan of care initiated [] Hold pending MD visit [] Discharge    Electronically signed by:  Yassine Lara PT    Note: If patient does not return for scheduled/ recommended follow up visits, this note will serve as a discharge from care along with most recent update on progress.

## 2022-10-31 ENCOUNTER — HOSPITAL ENCOUNTER (OUTPATIENT)
Dept: PHYSICAL THERAPY | Age: 47
Setting detail: THERAPIES SERIES
Discharge: HOME | End: 2022-10-31
Payer: COMMERCIAL

## 2022-10-31 PROCEDURE — 97110 THERAPEUTIC EXERCISES: CPT

## 2022-10-31 PROCEDURE — 97016 VASOPNEUMATIC DEVICE THERAPY: CPT

## 2022-10-31 PROCEDURE — 97112 NEUROMUSCULAR REEDUCATION: CPT

## 2022-11-07 ENCOUNTER — HOSPITAL ENCOUNTER (OUTPATIENT)
Dept: PHYSICAL THERAPY | Age: 47
Setting detail: THERAPIES SERIES
Discharge: HOME | End: 2022-11-07
Payer: COMMERCIAL

## 2022-11-07 PROCEDURE — 97016 VASOPNEUMATIC DEVICE THERAPY: CPT

## 2022-11-07 PROCEDURE — 97110 THERAPEUTIC EXERCISES: CPT

## 2022-11-07 PROCEDURE — 97112 NEUROMUSCULAR REEDUCATION: CPT

## 2022-11-07 NOTE — PROGRESS NOTES
[facility] and [facility], [address] [place] [address] [address]  Phone: [phone]   Fax:     [phone]      Date: 2022          Patient Name; :  Gordy Smith; 1975   Dx:   Complex tear of medial meniscus of left knee as current injury S83.232A     Physician:  Dr. Mirta Willett        Total PT Visits: 6     Measures Previous Current   Pain (0-10)     Disability %     ROM 0-90 0-120             Strength                 Date:  2022    Patient Name:  Gordy Smith    :  1975  MRN: [id number]  Restrictions/Precautions:    Medical/Treatment Diagnosis Information:  Diagnosis: Complex tear of medial meniscus of left knee as current injury S83.232A  Treatment Diagnosis: Left knee pain M25. 803  Insurance/Certification information:  PT Insurance Information: [facility]  Physician Information:   Dr. Mirta Willett  Has the plan of care been signed (Y/N):        [x]  Yes  []  No     Date of Patient follow up with Physician: 10/3/22    Is this a Progress Report:     []  Yes  [x]  No      If Yes:  Date Range for reporting period:  Beginnin22 ------------ Ending: 10/29/22  Beginning:10/29/22 ------------ Ending: 10/29/22    Progress report will be due (10 Rx or 30 days whichever is less):      Recertification will be due (POC Duration  / 90 days whichever is less): 22      Visit # Insurance Allowable Auth Required   In Person 6 60 []  Yes     []  No    Tele Health 0  []  Yes     []  No    Total 6       FOTO Score: 36     Date assessed:         Latex Allergy:  [x]NO      []YES  Preferred Language for Healthcare:   [x]English       []other:    Pain level:  0-2/10     SUBJECTIVE:  Pt 6 wks PO. Stated no complaints stated has not done HEP this week. Pt reports needs 1x/wk with PT due to her work schedule.      OBJECTIVE: See eval  Observation:   Test measurements:      RESTRICTIONS/PRECAUTIONS: 22 s/p L knee medial meniscus root repair, chondroplasty  Patient will follow meniscal repair protocol, TTWB 25% and crutches for 6 weeks and -10 to 90 degrees to be progressed per protocol    Exercises/Interventions:   Therapeutic Ex (07240) Sets/sec Reps Notes/CUES HEP   Bike ROM  Heel prop- supine 5 min     vc    Calf stretch belt 30s 3 vc    HS stretch long sitting 30s 3 vc    Quad sets 10s 10 vc    SLR SSLR 1#  1# 3x10  3x10 vc    Ankle pumps  30 Silver TB , vc    Heel slides <90 deg  10 vc    Gait training  5 min Cues for sequencing and weight bearing precautions    Leg extensions 90-40 1# 3x10 Within ROM    Prone hang    Standing hip abd/ext  TKE  Weight shifting/SLS  HR    Gait training     1#  orange 5 min    3x10L only  3x10L  1 min  30    10 min               Single crutch, cues for sequencing           Pt education 10 min  Reviewed precautions, HEP with gradual progression, goals of PT, not to push through pain with ex, use of RICE principles- pt stated understanding    Manual Intervention ([id number])                                                 NMR re-education (92368)   CUES NEEDED                                                                   Therapeutic Activity (12151)                                          [email or web address] access code:  [facility]           Therapeutic Exercise and NMR EXR  [x] (51522) Provided verbal/tactile cueing for activities related to strengthening, flexibility, endurance, ROM for improvements in LE, proximal hip, and core control with self care, mobility, lifting, ambulation. [x] (40270) Provided verbal/tactile cueing for activities related to improving balance, coordination, kinesthetic sense, posture, motor skill, proprioception to assist with LE, proximal hip, and core control in self-care, mobility, lifting, ambulation and eccentric single leg control.      NMR and Therapeutic Activities:    [x] (14572 or 42443) Provided verbal/tactile cueing for activities related to improving balance, coordination, kinesthetic sense, posture, motor skill, proprioception and motor activation to allow for proper function of core, proximal hip and LE with self-care and ADLs and functional mobility.   [] (73401) Gait Re-education- Provided training and instruction to the patient for proper LE, core and proximal hip recruitment and positioning and eccentric body weight control with ambulation re-education including up and down stairs     Home Exercise Program:    [x] (80562) Reviewed/Progressed HEP activities related to strengthening, flexibility, endurance, ROM of core, proximal hip and LE for functional self-care, mobility, lifting and ambulation/stair navigation   [] (65600) Reviewed/Progressed HEP activities related to improving balance, coordination, kinesthetic sense, posture, motor skill, proprioception of core, proximal hip and LE for self-care, mobility, lifting, and ambulation/stair navigation      Manual Treatments:  PROM / STM / Oscillations-Mobs:  G-I, II, III, IV (PA's, Inf., Post.)  [] (06286) Provided manual therapy to mobilize LE, proximal hip and/or LS spine soft tissue/joints for the purpose of modulating pain, promoting relaxation, increasing ROM, reducing/eliminating soft tissue swelling/inflammation/restriction, improving soft tissue extensibility and allowing for proper ROM for normal function with self-care, mobility, lifting and ambulation. Modalities:  at home   [x] GAME READY (VASO)- for significant edema, swelling, pain control.      Charges:  Timed Code Treatment Minutes: 39   Total Treatment Minutes:  49   BWC:  TE TIME:  NMR TIME:  MANUAL TIME:  UNTIMED MINUTES:  Medicare Total:                 [] EVAL (LOW) 44111 (typically 20 minutes face-to-face)  [] EVAL (MOD) 63652 (typically 30 minutes face-to-face)  [] EVAL (HIGH) 54796 (typically 45 minutes face-to-face)  [] RE-EVAL     [x] FU(10498) x    2 [] IONTO  [x] NMR (94205) x     [x] VASO  [] Manual (02996) x     [] Other:  [] TA x [] [facility] Traction (24341)  [] ES(attended) (62508)      [] ES (un) (46647):    ASSESSMENT:   Updated precautions 6 wks PO, pt reported no increased symptoms. Pt progressing well within protocol. Pt reports has improved compliance with her WB and brace wear, however remains non compliant with her HEP and unfortunately stated her work schedule is such that she is only able to come to PT 1x/wk. Educated on importance of compliance with HEP. No complaints at conclusion. GOALS:      Patient stated goal: Walking no pain. Therapist goals for Patient:   Short Term Goals: To be achieved in: 2 weeks  1. Independent in HEP and progression per patient tolerance, in order to prevent re-injury. [x] Progressing: [] Met: [] Not Met: [] Adjusted     2. Patient will have a decrease in pain to facilitate improvement in movement, function, and ADLs as indicated by Functional Deficits. [x] Progressing: [] Met: [] Not Met: [] Adjusted     Long Term Goals: To be achieved in: 12 weeks  1. FOTO score will match or exceed predicted score to assist with reaching prior level of function. [x] Progressing: [] Met: [] Not Met: [] Adjusted     2. Patient will demonstrate increased AROM to 0-130 to allow for proper joint functioning as indicated by patients Functional Deficits. [x] Progressing: [] Met: [] Not Met: [] Adjusted     3. Patient will demonstrate an increase in Strength to good proximal hip strength and control, within 5lb HHD in LE to allow for proper functional mobility as indicated by patients Functional Deficits. [x] Progressing: [] Met: [] Not Met: [] Adjusted     4. Patient will return to functional activities standing/walking 20-30 mins I no AD without increased symptoms or restriction. [x] Progressing: [] Met: [] Not Met: [] Adjusted     5.  Ascend/descend 1 flight of steps I no AD (patient specific functional goal)    [x] Progressing: [] Met: [] Not Met: [] Adjusted          Overall Progression Towards Functional goals/ Treatment Progress Update:  [x] Patient is progressing as expected towards functional goals listed. [] Progression is slowed due to complexities/Impairments listed. [] Progression has been slowed due to co-morbidities. [] Plan just implemented, too soon to assess goals progression <30days   [] Goals require adjustment due to lack of progress  [] Patient is not progressing as expected and requires additional follow up with physician  [] Other    Prognosis for POC: [x] Good [] Fair  [] Poor      Patient requires continued skilled intervention: [x] Yes  [] No    Treatment/Activity Tolerance:  [x] Patient able to complete treatment  [] Patient limited by fatigue  [] Patient limited by pain    [] Patient limited by other medical complications  [] Other:     Return to Play: (if applicable)   []  Stage 1: Intro to Strength   []  Stage 2: Return to Run and Strength   []  Stage 3: Return to Jump and Strength   []  Stage 4: Dynamic Strength and Agility   []  Stage 5: Sport Specific Training     []  Ready to Return to Play, Meets All Above Stages   []  Not Ready for Return to Sports   Comments:                          PLAN: See eval  [x] Continue per plan of care [] Alter current plan (see comments above)  [] Plan of care initiated [] Hold pending MD visit [] Discharge    Electronically signed by:  [facility], PT    Note: If patient does not return for scheduled/ recommended follow up visits, this note will serve as a discharge from care along with most recent update on progress.

## 2022-11-14 ENCOUNTER — HOSPITAL ENCOUNTER (OUTPATIENT)
Dept: PHYSICAL THERAPY | Age: 47
Setting detail: THERAPIES SERIES
Discharge: HOME | End: 2022-11-14
Payer: COMMERCIAL

## 2022-11-14 ENCOUNTER — OFFICE VISIT (OUTPATIENT)
Dept: ORTHOPEDIC SURGERY | Age: 47
End: 2022-11-14

## 2022-11-14 VITALS — BODY MASS INDEX: 38.41 KG/M2 | HEIGHT: 64 IN | WEIGHT: 225 LBS

## 2022-11-14 DIAGNOSIS — I82.402 DEEP VEIN THROMBOSIS (DVT) OF LEFT LOWER EXTREMITY, UNSPECIFIED CHRONICITY, UNSPECIFIED VEIN (HCC): Primary | ICD-10-CM

## 2022-11-14 PROCEDURE — 97016 VASOPNEUMATIC DEVICE THERAPY: CPT

## 2022-11-14 PROCEDURE — 99024 POSTOP FOLLOW-UP VISIT: CPT | Performed by: PHYSICIAN ASSISTANT

## 2022-11-14 PROCEDURE — 97110 THERAPEUTIC EXERCISES: CPT

## 2022-11-14 PROCEDURE — 97112 NEUROMUSCULAR REEDUCATION: CPT

## 2022-11-14 NOTE — FLOWSHEET NOTE
[facility], [address] [place] [address], [address] [place] [address] [address]  Phone: [phone]   Fax:     [phone]      Date:  2022    Patient Name:  Gordy Smith    :  1975  MRN: [id number]  Restrictions/Precautions:    Medical/Treatment Diagnosis Information:  Diagnosis: Complex tear of medial meniscus of left knee as current injury S83.232A  Treatment Diagnosis: Left knee pain M25. 536  Insurance/Certification information:  PT Insurance Information: [facility]  Physician Information:   Dr. Mirta Willett  Has the plan of care been signed (Y/N):        [x]  Yes  []  No     Date of Patient follow up with Physician: 10/3/22    Is this a Progress Report:     []  Yes  [x]  No      If Yes:  Date Range for reporting period:  Beginnin22 ------------ Ending: 10/29/22  Beginning:10/29/22 ------------ Ending: 10/29/22    Progress report will be due (10 Rx or 30 days whichever is less):      Recertification will be due (POC Duration  / 90 days whichever is less): 22      Visit # Insurance Allowable Auth Required   In Person 7 60 []  Yes     []  No    [facility] 0  []  Yes     []  No    Total 7       FOTO Score: 36     Date assessed:         Latex Allergy:  [x]NO      []YES  Preferred Language for Healthcare:   [x]English       []other:    Pain level:  0-2/10     SUBJECTIVE:  Pt 7 wks PO. Pt reports feels like her calf mm is sore. Pt reports needs 1x/wk with PT due to her work schedule.      OBJECTIVE: See eval  Observation:   Test measurements:      RESTRICTIONS/PRECAUTIONS: 22 s/p L knee medial meniscus root repair, chondroplasty  Patient will follow meniscal repair protocol, TTWB 25% and crutches for 6 weeks and -10 to 90 degrees to be progressed per protocol    Exercises/Interventions:   Therapeutic Ex (50288) Sets/sec Reps Notes/CUES HEP   Bike ROM  Heel prop- supine 5 min     vc    Calf stretch belt 30s 3 vc    HS stretch long sitting 30s 3 vc    Quad sets 10s vc    SLR SSLR 2#  2# 3x10  3x10 vc       Heel slides 10 vc    Gait training  5 min Cues for sequencing and weight bearing precautions    Leg extensions 90-40 2# 3x10 Within ROM    Prone hang    MATEUS- ABD/EXT  TKE  Weight shifting/SLS  Calf stretch incline  Leg press <90  Gait training  SLS  Wall sits     45#  60#    30s  80#    10s   5 min    3x10L only  3x10L  1 min  3  3x10  5 min  10  NV               Single crutch, cues for sequencing           Pt education 10 min  Reviewed precautions, HEP with gradual progression, goals of PT, not to push through pain with ex, use of RICE principles- pt stated understanding    Manual Intervention (76971)                                                 NMR re-education (78666)   CUES NEEDED    NMR with SLR 8 min                                                              Therapeutic Activity (44651)                                          [employer] access code:  [facility]           Therapeutic Exercise and NMR EXR  [x] (69272) Provided verbal/tactile cueing for activities related to strengthening, flexibility, endurance, ROM for improvements in LE, proximal hip, and core control with self care, mobility, lifting, ambulation. [x] (81738) Provided verbal/tactile cueing for activities related to improving balance, coordination, kinesthetic sense, posture, motor skill, proprioception to assist with LE, proximal hip, and core control in self-care, mobility, lifting, ambulation and eccentric single leg control.      NMR and Therapeutic Activities:    [x] (14528 or 56057) Provided verbal/tactile cueing for activities related to improving balance, coordination, kinesthetic sense, posture, motor skill, proprioception and motor activation to allow for proper function of core, proximal hip and LE with self-care and ADLs and functional mobility.   [] (44566) Gait Re-education- Provided training and instruction to the patient for proper LE, core and proximal hip recruitment and positioning and eccentric body weight control with ambulation re-education including up and down stairs     Home Exercise Program:    [x] (32693) Reviewed/Progressed HEP activities related to strengthening, flexibility, endurance, ROM of core, proximal hip and LE for functional self-care, mobility, lifting and ambulation/stair navigation   [] (99518) Reviewed/Progressed HEP activities related to improving balance, coordination, kinesthetic sense, posture, motor skill, proprioception of core, proximal hip and LE for self-care, mobility, lifting, and ambulation/stair navigation      Manual Treatments:  PROM / STM / Oscillations-Mobs:  G-I, II, III, IV (PA's, Inf., Post.)  [x] (48437) Provided manual therapy to mobilize LE, proximal hip and/or LS spine soft tissue/joints for the purpose of modulating pain, promoting relaxation, increasing ROM, reducing/eliminating soft tissue swelling/inflammation/restriction, improving soft tissue extensibility and allowing for proper ROM for normal function with self-care, mobility, lifting and ambulation. Modalities:  at home   [x] GAME READY (VASO)- for significant edema, swelling, pain control. Charges:  Timed Code Treatment Minutes: 45   Total Treatment Minutes:  55   BWC:  TE TIME:  NMR TIME:  MANUAL TIME:  UNTIMED MINUTES:  Medicare Total:                 [] EVAL (LOW) 13557 (typically 20 minutes face-to-face)  [] EVAL (MOD) 60665 (typically 30 minutes face-to-face)  [] EVAL (HIGH) 05882 (typically 45 minutes face-to-face)  [] RE-EVAL     [x] EY(47106) x    2 [] IONTO  [x] NMR (05098) x     [x] VASO  [] Manual (28215) x     [] Other:  [] TA x      [] Mech Traction (52239)  [] ES(attended) (46923)      [] ES (un) (44425):    ASSESSMENT:   Updated precautions 6 wks PO, pt reported no increased symptoms. Pt progressing well within protocol.  Pt reports has improved compliance with her WB and brace wear, however remains non compliant with her HEP and unfortunately stated her work schedule is such that she is only able to come to PT 1x/wk. Educated on importance of compliance with HEP. No complaints at conclusion. Educated on signs/symptoms of DVT to seek emergent care- pt stated understanding. BFR NV pending progress and symptoms. GOALS:      Patient stated goal: Walking no pain. Therapist goals for Patient:   Short Term Goals: To be achieved in: 2 weeks  1. Independent in HEP and progression per patient tolerance, in order to prevent re-injury. [x] Progressing: [] Met: [] Not Met: [] Adjusted     2. Patient will have a decrease in pain to facilitate improvement in movement, function, and ADLs as indicated by Functional Deficits. [x] Progressing: [] Met: [] Not Met: [] Adjusted     Long Term Goals: To be achieved in: 12 weeks  1. FOTO score will match or exceed predicted score to assist with reaching prior level of function. [x] Progressing: [] Met: [] Not Met: [] Adjusted     2. Patient will demonstrate increased AROM to 0-130 to allow for proper joint functioning as indicated by patients Functional Deficits. [x] Progressing: [] Met: [] Not Met: [] Adjusted     3. Patient will demonstrate an increase in Strength to good proximal hip strength and control, within 5lb HHD in LE to allow for proper functional mobility as indicated by patients Functional Deficits. [x] Progressing: [] Met: [] Not Met: [] Adjusted     4. Patient will return to functional activities standing/walking 20-30 mins I no AD without increased symptoms or restriction. [x] Progressing: [] Met: [] Not Met: [] Adjusted     5. Ascend/descend 1 flight of steps I no AD (patient specific functional goal)    [x] Progressing: [] Met: [] Not Met: [] Adjusted          Overall Progression Towards Functional goals/ Treatment Progress Update:  [x] Patient is progressing as expected towards functional goals listed. [] Progression is slowed due to complexities/Impairments listed.   [] Progression has been slowed due to co-morbidities. [] Plan just implemented, too soon to assess goals progression <30days   [] Goals require adjustment due to lack of progress  [] Patient is not progressing as expected and requires additional follow up with physician  [] Other    Prognosis for POC: [x] Good [] Fair  [] Poor      Patient requires continued skilled intervention: [x] Yes  [] No    Treatment/Activity Tolerance:  [x] Patient able to complete treatment  [] Patient limited by fatigue  [] Patient limited by pain    [] Patient limited by other medical complications  [] Other:     Return to Play: (if applicable)   []  Stage 1: Intro to Strength   []  Stage 2: Return to Run and Strength   []  Stage 3: Return to Jump and Strength   []  Stage 4: Dynamic Strength and Agility   []  Stage 5: Sport Specific Training     []  Ready to Return to Play, Meets All Above Stages   []  Not Ready for Return to Sports   Comments:                          PLAN: See eval  [x] Continue per plan of care [] Alter current plan (see comments above)  [] Plan of care initiated [] Hold pending MD visit [] Discharge    Electronically signed by:  Tae Miranda PT    Note: If patient does not return for scheduled/ recommended follow up visits, this note will serve as a discharge from care along with most recent update on progress.

## 2022-11-14 NOTE — PROGRESS NOTES
POST OPERATIVE ORTHOPAEDIC NOTE    DOS: 9/23/2022  PROCEDURES: Left knee arthroscopic posterior medial meniscal root repair and chondroplasty    The patient has been recovering and is being seen for Dr. Mirta Willett. . The patient states the pain is 0-1/10 pain. Patient is complaining of medial calf pain which she states is very limiting factor and finds that pushing off and dorsiflexion is increased over the medial mid calf. the patient has been in started PT. patient's been continued full weightbearing with use of a single crutch. Pain Assessment  Location of Pain: Knee  Location Modifiers: Left  Severity of Pain: 0  Quality of Pain: Other (Comment)  Duration of Pain: Other (Comment)  Frequency of Pain: Other (Comment)]    Focused pertinent physical examination of the operative extremity:  Wounds C/D/I, well healing surgical incisions  No erythema or drainage  No swelling in the leg  Palpable medial calf pain with positive Homans  Skin intact throughout  5/5 IP Q H TA G EHL  SILT DP SP LP MP S S  +2 DP pulse      Assessment and plan: The patient is now almost 2 months status post the above listed procedure and is recovering. Possible postop DVT    Patient was sent for urgent ultrasound for ultrasound evaluation of potential DVT. -Patient was educated as to scar tissue massage. -OTC Tylenol per bottle as needed discomfort.  -She will continue formal physical therapy and they will continue a medial meniscus repair protocol.   -All questions answered to the patient's satisfaction and the patient expressed understanding and agreement with the above listed treatment plan  -Follow up in 4 weeks time and we will contact her with the results of the ultrasound.  -Thank you for the clinical consultation and allowing me to participate in the patient's care. Addendum: I was contacted by the vascular lab this morning that the patient does have an acute medial calf DVT.   Patient was referred to her primary care physician Dr. Yasmeen Lehman for his continue evaluation and care of her DVT. Elissa Beatty PA-C  Board certified by the [facility]        Disclaimer: This note was dictated with voice recognition software. Though review and correction are routinely performed, please contact the office/medical records for any errors requiring correction.

## 2022-11-14 NOTE — LETTER
POST OPERATIVE ORTHOPAEDIC NOTE     DOS: 9/23/2022  PROCEDURES: Left knee arthroscopic posterior medial meniscal root repair and chondroplasty     The patient has been recovering and is being seen for Dr. Natacha Carvalho. . The patient states the pain is 0-1/10 pain. Patient is complaining of medial calf pain which she states is very limiting factor and finds that pushing off and dorsiflexion is increased over the medial mid calf. the patient has been in started PT. patient's been continued full weightbearing with use of a single crutch. Pain Assessment  Location of Pain: Knee  Location Modifiers: Left  Severity of Pain: 0  Quality of Pain: Other (Comment)  Duration of Pain: Other (Comment)  Frequency of Pain: Other (Comment)]     Focused pertinent physical examination of the operative extremity:  Wounds C/D/I, well healing surgical incisions  No erythema or drainage  No swelling in the leg  Palpable medial calf pain with positive Homans  Skin intact throughout  5/5 IP Q H TA G EHL  SILT DP SP LP MP S S  +2 DP pulse        Assessment and plan: The patient is now almost 2 months status post the above listed procedure and is recovering. Possible postop DVT     Patient was sent for urgent ultrasound for ultrasound evaluation of potential DVT. -Patient was educated as to scar tissue massage. -OTC Tylenol per bottle as needed discomfort.  -She will continue formal physical therapy and they will continue a medial meniscus repair protocol.   -All questions answered to the patient's satisfaction and the patient expressed understanding and agreement with the above listed treatment plan  -Follow up in 4 weeks time and we will contact her with the results of the ultrasound.  -Thank you for the clinical consultation and allowing me to participate in the patient's care.        Addendum: I was contacted by the vascular lab this morning that the patient does have an acute medial calf DVT.   Patient was referred to her primary care physician Dr. Evie Canada for his continue evaluation and care of her DVT.     Troi Hernandes PA-C  Board certified by the [facility] [address]: This note was dictated with voice recognition software.  Though review and correction are routinely performed, please contact the office/medical records for any errors requiring correction.

## 2022-11-15 ENCOUNTER — HOSPITAL ENCOUNTER (OUTPATIENT)
Dept: VASCULAR LAB | Age: 47
Discharge: HOME | End: 2022-11-15
Payer: COMMERCIAL

## 2022-11-15 DIAGNOSIS — I82.402 DEEP VEIN THROMBOSIS (DVT) OF LEFT LOWER EXTREMITY, UNSPECIFIED CHRONICITY, UNSPECIFIED VEIN (HCC): ICD-10-CM

## 2022-11-15 PROCEDURE — 93971 EXTREMITY STUDY: CPT

## 2022-11-17 ENCOUNTER — TELEPHONE (OUTPATIENT)
Dept: ORTHOPEDIC SURGERY | Age: 47
End: 2022-11-17

## 2022-11-17 NOTE — TELEPHONE ENCOUNTER
General Question     Subject: PATIENT HAS DEVELOPED CLOT  Patient and /or Facility Request: SHOULD SHE CONTINUE WEARING HER KNEE BRACE--SX WAS 9.23?  Flakito Philip Number:  [phone]

## 2022-11-17 NOTE — TELEPHONE ENCOUNTER
Spoke with the patient. I advised her that she should work with her physical therapist to dtermine if she is ready to not wear the brace. She also stated that she is doing better with the blood clot and she was released with medications.      We will see her on 12/12 for 3mo post op

## 2022-11-21 ENCOUNTER — HOSPITAL ENCOUNTER (OUTPATIENT)
Dept: PHYSICAL THERAPY | Age: 47
Setting detail: THERAPIES SERIES
Discharge: HOME | End: 2022-11-21
Payer: COMMERCIAL

## 2022-11-21 PROCEDURE — 97112 NEUROMUSCULAR REEDUCATION: CPT

## 2022-11-21 PROCEDURE — 97110 THERAPEUTIC EXERCISES: CPT

## 2022-11-21 NOTE — FLOWSHEET NOTE
[address] [place] [address] and [facility] Rehabilitation, [address] [place] [address], [address] [place] [address] [address]  Phone: [phone]   Fax:     [phone]      Date:  2022    Patient Name:  Sarika Garcia    :  1975  MRN: [id number]  Restrictions/Precautions:    Medical/Treatment Diagnosis Information:  Diagnosis: Complex tear of medial meniscus of left knee as current injury S83.232A  Treatment Diagnosis: Left knee pain M25. 908  Insurance/Certification information:  PT Insurance Information: [facility]  Physician Information:   Dr. Meghan Rosario  Has the plan of care been signed (Y/N):        [x]  Yes  []  No     Date of Patient follow up with Physician: 10/3/22    Is this a Progress Report:     []  Yes  [x]  No      If Yes:  Date Range for reporting period:  Beginnin22 ------------ Ending: 10/29/22  Beginning:10/29/22 ------------ Ending: 10/29/22    Progress report will be due (10 Rx or 30 days whichever is less):      Recertification will be due (POC Duration  / 90 days whichever is less): 22      Visit # Insurance Allowable Auth Required   In Person 8 60 []  Yes     []  No    Tele Health 0  []  Yes     []  No    Total 8       FOTO Score: 36     Date assessed:         Latex Allergy:  [x]NO      []YES  Preferred Language for Healthcare:   [x]English       []other:    Pain level:  0-2/10     SUBJECTIVE:  Pt 8 wks PO. Pt stated did get dopplar following last appt did have DVT stated is on Eloquis since Tues. Stated pain in calf has improved stated knee continues to feel great. Pt reports needs 1x/wk with PT due to her work schedule.      OBJECTIVE: See eval  Observation:   Test measurements:      RESTRICTIONS/PRECAUTIONS: 22 s/p L knee medial meniscus root repair, chondroplasty  Patient will follow meniscal repair protocol, TTWB 25% and crutches for 6 weeks and -10 to 90 degrees to be progressed per protocol    Exercises/Interventions:   Therapeutic Ex (21883) Sets/sec Reps Notes/CUES HEP   Bike ROM  Heel prop- supine 6 min     vc    Calf stretch belt 30s 3 vc    HS stretch long sitting 30s 3 vc    Quad sets 10s vc    SLR SSLR 2#  2# 3x10  3x10 vc       Heel slides 10 vc    Gait training  5 min Cues for sequencing and weight bearing precautions    Leg extensions 90-40 ecc 10# 3x10 Within ROM    Prone hang    MATEUS- ABD/EXT  TKE  Weight shifting/SLS  Leg press <90  Gait training  SLS  Wall sits     45#  60#    30s  80#    10s   5 min    3x10L only  3x10L  1 min  3x10  5 min  10  NV               Single crutch, cues for sequencing  foam           Pt education 10 min  Reviewed precautions, HEP with gradual progression, goals of PT, not to push through pain with ex, use of RICE principles- pt stated understanding    Manual Intervention ([id number])                                                 NMR re-education (80550)   CUES NEEDED    NMR with SLR                                                              Therapeutic Activity (35597)                                          [employer] access code:  [facility]           Therapeutic Exercise and NMR EXR  [x] (91679) Provided verbal/tactile cueing for activities related to strengthening, flexibility, endurance, ROM for improvements in LE, proximal hip, and core control with self care, mobility, lifting, ambulation. [x] (93634) Provided verbal/tactile cueing for activities related to improving balance, coordination, kinesthetic sense, posture, motor skill, proprioception to assist with LE, proximal hip, and core control in self-care, mobility, lifting, ambulation and eccentric single leg control. NMR and Therapeutic Activities:    [x] (05189 or 14211) Provided verbal/tactile cueing for activities related to improving balance, coordination, kinesthetic sense, posture, motor skill, proprioception and motor activation to allow for proper function of core, proximal hip and LE with self-care and ADLs and functional mobility.    [] (04716) Gait Re-education- Provided training and instruction to the patient for proper LE, core and proximal hip recruitment and positioning and eccentric body weight control with ambulation re-education including up and down stairs     Home Exercise Program:    [x] (31147) Reviewed/Progressed HEP activities related to strengthening, flexibility, endurance, ROM of core, proximal hip and LE for functional self-care, mobility, lifting and ambulation/stair navigation   [] (92350) Reviewed/Progressed HEP activities related to improving balance, coordination, kinesthetic sense, posture, motor skill, proprioception of core, proximal hip and LE for self-care, mobility, lifting, and ambulation/stair navigation      Manual Treatments:  PROM / STM / Oscillations-Mobs:  G-I, II, III, IV (PA's, Inf., Post.)  [x] (95705) Provided manual therapy to mobilize LE, proximal hip and/or LS spine soft tissue/joints for the purpose of modulating pain, promoting relaxation, increasing ROM, reducing/eliminating soft tissue swelling/inflammation/restriction, improving soft tissue extensibility and allowing for proper ROM for normal function with self-care, mobility, lifting and ambulation. Modalities:  at home   [] GAME READY (VASO)- for significant edema, swelling, pain control. Charges:  Timed Code Treatment Minutes: 38   Total Treatment Minutes:  38   BWC:  TE TIME:  NMR TIME:  MANUAL TIME:  UNTIMED MINUTES:  Medicare Total:                 [] EVAL (LOW) 95578 (typically 20 minutes face-to-face)  [] EVAL (MOD) 79679 (typically 30 minutes face-to-face)  [] EVAL (HIGH) 68534 (typically 45 minutes face-to-face)  [] RE-EVAL     [x] PV(70452) x    2 [] IONTO  [x] NMR (40121) x     [] VASO  [] Manual (05277) x     [] Other:  [] TA x      [] Mech Traction (84722)  [] ES(attended) (61448)      [] ES (un) (29006):    ASSESSMENT:   Updated precautions 6 wks PO, pt reported no increased symptoms. Pt progressing well within protocol.  Pt reports has improved compliance with her WB and brace wear, however remains non compliant with her HEP stated this week was due to calf pain, and unfortunately stated her work schedule is such that she is only able to come to PT 1x/wk. Educated on importance of compliance with HEP. No complaints at conclusion. Educated on signs/symptoms of DVT to seek emergent care- pt stated understanding. Not doing BFR due to DVT dx. GOALS:      Patient stated goal: Walking no pain. Therapist goals for Patient:   Short Term Goals: To be achieved in: 2 weeks  1. Independent in HEP and progression per patient tolerance, in order to prevent re-injury. [x] Progressing: [] Met: [] Not Met: [] Adjusted     2. Patient will have a decrease in pain to facilitate improvement in movement, function, and ADLs as indicated by Functional Deficits. [x] Progressing: [] Met: [] Not Met: [] Adjusted     Long Term Goals: To be achieved in: 12 weeks  1. FOTO score will match or exceed predicted score to assist with reaching prior level of function. [x] Progressing: [] Met: [] Not Met: [] Adjusted     2. Patient will demonstrate increased AROM to 0-130 to allow for proper joint functioning as indicated by patients Functional Deficits. [x] Progressing: [] Met: [] Not Met: [] Adjusted     3. Patient will demonstrate an increase in Strength to good proximal hip strength and control, within 5lb HHD in LE to allow for proper functional mobility as indicated by patients Functional Deficits. [x] Progressing: [] Met: [] Not Met: [] Adjusted     4. Patient will return to functional activities standing/walking 20-30 mins I no AD without increased symptoms or restriction. [x] Progressing: [] Met: [] Not Met: [] Adjusted     5.  Ascend/descend 1 flight of steps I no AD (patient specific functional goal)    [x] Progressing: [] Met: [] Not Met: [] Adjusted          Overall Progression Towards Functional goals/ Treatment Progress Update:  [x] Patient is progressing as expected towards functional goals listed. [] Progression is slowed due to complexities/Impairments listed. [] Progression has been slowed due to co-morbidities. [] Plan just implemented, too soon to assess goals progression <30days   [] Goals require adjustment due to lack of progress  [] Patient is not progressing as expected and requires additional follow up with physician  [] Other    Prognosis for POC: [x] Good [] Fair  [] Poor      Patient requires continued skilled intervention: [x] Yes  [] No    Treatment/Activity Tolerance:  [x] Patient able to complete treatment  [] Patient limited by fatigue  [] Patient limited by pain    [] Patient limited by other medical complications  [] Other:     Return to Play: (if applicable)   []  Stage 1: Intro to Strength   []  Stage 2: Return to Run and Strength   []  Stage 3: Return to Jump and Strength   []  Stage 4: Dynamic Strength and Agility   []  Stage 5: Sport Specific Training     []  Ready to Return to Play, Meets All Above Stages   []  Not Ready for Return to Sports   Comments:                          PLAN: See eval  [x] Continue per plan of care [] Alter current plan (see comments above)  [] Plan of care initiated [] Hold pending MD visit [] Discharge    Electronically signed by:  Praveena Wood PT    Note: If patient does not return for scheduled/ recommended follow up visits, this note will serve as a discharge from care along with most recent update on progress.

## 2022-11-28 ENCOUNTER — HOSPITAL ENCOUNTER (OUTPATIENT)
Dept: PHYSICAL THERAPY | Age: 47
Setting detail: THERAPIES SERIES
Discharge: HOME | End: 2022-11-28
Payer: COMMERCIAL

## 2022-11-28 PROCEDURE — 97110 THERAPEUTIC EXERCISES: CPT

## 2022-11-28 PROCEDURE — 97112 NEUROMUSCULAR REEDUCATION: CPT

## 2022-11-28 NOTE — FLOWSHEET NOTE
[facility] [facility], [address] [place] [address], [address] [place] [address] [address]  Phone: [phone]   Fax:     [phone]      Date:  2022    Patient Name:  Raul Knapp    :  1975  MRN: [id number]  Restrictions/Precautions:    Medical/Treatment Diagnosis Information:  Diagnosis: Complex tear of medial meniscus of left knee as current injury S83.232A  Treatment Diagnosis: Left knee pain M25. 797  Insurance/Certification information:  PT Insurance Information: [facility]  Physician Information:   Dr. Deon Fairchild  Has the plan of care been signed (Y/N):        [x]  Yes  []  No     Date of Patient follow up with Physician: 10/3/22    Is this a Progress Report:     []  Yes  [x]  No      If Yes:  Date Range for reporting period:  Beginnin22 ------------ Ending: 10/29/22  Beginning:10/29/22 ------------ Ending: 10/29/22    Progress report will be due (10 Rx or 30 days whichever is less):      Recertification will be due (POC Duration  / 90 days whichever is less): 22      Visit # Insurance Allowable Auth Required   In Person 8 60 []  Yes     []  No    Tele Health 0  []  Yes     []  No    Total 8       FOTO Score: 36     Date assessed:         Latex Allergy:  [x]NO      []YES  Preferred Language for Healthcare:   [x]English       []other:    Pain level:  0-2/10     SUBJECTIVE:  Pt 8 wks PO. Pt stated did get dopplar following last appt did have DVT stated is on Eloquis since Tues. Stated pain in calf has improved stated knee continues to feel great. Pt reports needs 1x/wk with PT due to her work schedule.      OBJECTIVE:   Observation:   Test measurements:    KNEE  AROM  Flexion 120  Extension 0          RESTRICTIONS/PRECAUTIONS: 22 s/p L knee medial meniscus root repair, chondroplasty  Patient will follow meniscal repair protocol, TTWB 25% and crutches for 6 weeks and -10 to 90 degrees to be progressed per protocol    Exercises/Interventions:   Therapeutic Ex (52049) Sets/sec Reps Notes/CUES HEP   Bike ROM  6 min     vc    30s 3 vc    HS stretch long sitting 30s 3 vc    Quad sets 10s vc    SLR 2#  2# 3x10  3x10 vc       Heel slides  20 vc    Gait training  5 min Cues for sequencing and weight bearing precautions    Leg extensions 90-40 ecc 10# 3x10 Within ROM    Prone hang    MATEUS- ABD/EXT  TKE  Weight shifting/SLS  Calf stretch incline  Leg press <90  Gait training  SLS  Wall sits     50#  60#    60s  80#    10s  10 sec 5 min    3x10L only  3x10L  1 min  1  3x10  5 min  10  6               Single crutch, cues for sequencing  foam    Heel raises 10x2      Toe raises 10x2      Anterior step ups x20  4\"                  Pt education 10 min  Reviewed precautions, HEP with gradual progression, goals of PT, not to push through pain with ex, use of RICE principles- pt stated understanding    Manual Intervention ([id number])                                                 NMR re-education (38166)   CUES NEEDED    NMR with SLR                                                              Therapeutic Activity (68503)                                          Omate access code:  [facility]           Therapeutic Exercise and NMR EXR  [x] (10177) Provided verbal/tactile cueing for activities related to strengthening, flexibility, endurance, ROM for improvements in LE, proximal hip, and core control with self care, mobility, lifting, ambulation. [x] (69416) Provided verbal/tactile cueing for activities related to improving balance, coordination, kinesthetic sense, posture, motor skill, proprioception to assist with LE, proximal hip, and core control in self-care, mobility, lifting, ambulation and eccentric single leg control.      NMR and Therapeutic Activities:    [x] (59380 or 81226) Provided verbal/tactile cueing for activities related to improving balance, coordination, kinesthetic sense, posture, motor skill, proprioception and motor activation to allow for proper function of core, proximal hip and LE with self-care and ADLs and functional mobility.   [] (95349) Gait Re-education- Provided training and instruction to the patient for proper LE, core and proximal hip recruitment and positioning and eccentric body weight control with ambulation re-education including up and down stairs     Home Exercise Program:    [x] (53384) Reviewed/Progressed HEP activities related to strengthening, flexibility, endurance, ROM of core, proximal hip and LE for functional self-care, mobility, lifting and ambulation/stair navigation   [] (13973) Reviewed/Progressed HEP activities related to improving balance, coordination, kinesthetic sense, posture, motor skill, proprioception of core, proximal hip and LE for self-care, mobility, lifting, and ambulation/stair navigation      Manual Treatments:  PROM / STM / Oscillations-Mobs:  G-I, II, III, IV (PA's, Inf., Post.)  [x] (74075) Provided manual therapy to mobilize LE, proximal hip and/or LS spine soft tissue/joints for the purpose of modulating pain, promoting relaxation, increasing ROM, reducing/eliminating soft tissue swelling/inflammation/restriction, improving soft tissue extensibility and allowing for proper ROM for normal function with self-care, mobility, lifting and ambulation. Modalities:  at home   [] GAME READY (VASO)- for significant edema, swelling, pain control.      Charges:  Timed Code Treatment Minutes: 38   Total Treatment Minutes:  38   BWC:  TE TIME:  NMR TIME:  MANUAL TIME:  UNTIMED MINUTES:  Medicare Total:                 [] EVAL (LOW) 36083 (typically 20 minutes face-to-face)  [] EVAL (MOD) 78736 (typically 30 minutes face-to-face)  [] EVAL (HIGH) 18767 (typically 45 minutes face-to-face)  [] RE-EVAL     [x] YR(91544) x    2 [] IONTO  [x] NMR (57441) 1    [] VASO  [] Manual (38785) x     [] Other:  [] TA x      [] Mech Traction (23377)  [] ES(attended) (09903)      [] ES (un) (74175):      ASSESSMENT:   [facility] session well. Gait looks very good. Single leg stability is good. GOALS:      Patient stated goal: Walking no pain. Therapist goals for Patient:   Short Term Goals: To be achieved in: 2 weeks  1. Independent in HEP and progression per patient tolerance, in order to prevent re-injury. [x] Progressing: [] Met: [] Not Met: [] Adjusted     2. Patient will have a decrease in pain to facilitate improvement in movement, function, and ADLs as indicated by Functional Deficits. [x] Progressing: [] Met: [] Not Met: [] Adjusted     Long Term Goals: To be achieved in: 12 weeks  1. FOTO score will match or exceed predicted score to assist with reaching prior level of function. [x] Progressing: [] Met: [] Not Met: [] Adjusted     2. Patient will demonstrate increased AROM to 0-130 to allow for proper joint functioning as indicated by patients Functional Deficits. [x] Progressing: [] Met: [] Not Met: [] Adjusted     3. Patient will demonstrate an increase in Strength to good proximal hip strength and control, within 5lb HHD in LE to allow for proper functional mobility as indicated by patients Functional Deficits. [x] Progressing: [] Met: [] Not Met: [] Adjusted     4. Patient will return to functional activities standing/walking 20-30 mins I no AD without increased symptoms or restriction. [x] Progressing: [] Met: [] Not Met: [] Adjusted     5. Ascend/descend 1 flight of steps I no AD (patient specific functional goal)    [x] Progressing: [] Met: [] Not Met: [] Adjusted          Overall Progression Towards Functional goals/ Treatment Progress Update:  [x] Patient is progressing as expected towards functional goals listed. [] Progression is slowed due to complexities/Impairments listed. [] Progression has been slowed due to co-morbidities.   [] Plan just implemented, too soon to assess goals progression <30days   [] Goals require adjustment due to lack of progress  [] Patient is not progressing as expected and requires additional follow up with physician  [] Other    Prognosis for POC: [x] Good [] Fair  [] Poor      Patient requires continued skilled intervention: [x] Yes  [] No    Treatment/Activity Tolerance:  [x] Patient able to complete treatment  [] Patient limited by fatigue  [] Patient limited by pain    [] Patient limited by other medical complications  [] Other:     Return to Play: (if applicable)   []  Stage 1: Intro to Strength   []  Stage 2: Return to Run and Strength   []  Stage 3: Return to Jump and Strength   []  Stage 4: Dynamic Strength and Agility   []  Stage 5: Sport Specific Training     []  Ready to Return to Play, Meets All Above Stages   []  Not Ready for Return to Sports   Comments:                          PLAN: See eval  [x] Continue per plan of care [] Alter current plan (see comments above)  [] Plan of care initiated [] Hold pending MD visit [] Discharge    Electronically signed by:  Adriana Chavez, PT    Note: If patient does not return for scheduled/ recommended follow up visits, this note will serve as a discharge from care along with most recent update on progress.

## 2022-12-05 ENCOUNTER — HOSPITAL ENCOUNTER (OUTPATIENT)
Dept: PHYSICAL THERAPY | Age: 47
Setting detail: THERAPIES SERIES
Discharge: HOME | End: 2022-12-05
Payer: COMMERCIAL

## 2022-12-05 PROCEDURE — 97530 THERAPEUTIC ACTIVITIES: CPT

## 2022-12-05 PROCEDURE — 97112 NEUROMUSCULAR REEDUCATION: CPT

## 2022-12-05 PROCEDURE — 97110 THERAPEUTIC EXERCISES: CPT

## 2022-12-05 NOTE — PROGRESS NOTES
[address]23 [facility], [address] [place] [address], [address]  Phone: [phone]   Fax:     [phone]      [address]23 [facility] and [address] [place] [address], [address] [place] [address], [address]  Phone: [phone]   Fax: [phone]    Date: 2022          Patient Name; :  Foreign Rudolph; 1975   Dx:   Complex tear of medial meniscus of left knee as current injury S80.12A     Physician:  Dr. Savanna Albert        Total PT Visits: 9     Measures Previous Current   Pain (0-10) 2-6 0   Disability %  28%   ROM 0-90 0-120             Strength  Hip flex 5/5     Knee ext 4+/5     Knee flex 4+/5     Specific Functional Improvements & Impressions:  ROM, strength and function gradually progressing. Pt reported no longer has the calf pain and has continued with her Eloquis, stated does have appt with PCP. Stated no complaints with ADL's. Stated does want to transition to  with HEP with gym program soon due to deductible will start over at beginning of the year. Plan & Recommendations:  [x] Continue rehabilitation due to objective improvement and continued functional deficits with frequency and duration: 1-2x/wk 4 wks  [] Progress toward  []GAP, []Work Conditioning, []Independent HEP   [] Discharge due to   [] All goals achieved, [] Maximized \"medical necessity\" [] No subjective or objective improvements      Electronically signed by:  Vanita Cedillo, PT  Therapy Plan of Care Re-Certification  This patient has been re-evaluated for physical therapy services and for therapy to continue, Medicare, Medicaid and other insurances require periodic physician review of the treatment plan.  Please review the above re-evaluation and verify that you agree with plan of care as established above by signing the attached document and return it to our office or note changes to established plan below  [] Follow treatment plan as above [] Discontinue physical therapy  [] Change plan to:                                 __________________________________________________    Physician Signature:____________________________________ Date:____________  By signing above, therapists plan is approved by physician    If you have any questions or concerns, please don't hesitate to call. Thank you for your referral.   Date:  2022    Patient Name:  Lan Dickens    :  1975  MRN: [id number]  Restrictions/Precautions:    Medical/Treatment Diagnosis Information:  Diagnosis: Complex tear of medial meniscus of left knee as current injury S83.232A  Treatment Diagnosis: Left knee pain M25. 903  Insurance/Certification information:  PT Insurance Information: [facility]  Physician Information:   Dr. Hunter Martinez  Has the plan of care been signed (Y/N):        [x]  Yes  []  No     Date of Patient follow up with Physician: 10/3/22    Is this a Progress Report:     [x]  Yes  []  No      If Yes:  Date Range for reporting period:  Beginnin22 ------------ Endin22  Beginnin22 ------------ Endin22    Progress report will be due (10 Rx or 30 days whichever is less): 15/66/90     Recertification will be due (POC Duration  / 90 days whichever is less): 22      Visit # Insurance Allowable Auth Required   In Person 9 60 []  Yes     []  No    Tele Health 0  []  Yes     []  No    Total 9       FOTO Score:      Date assessed:          Latex Allergy:  [x]NO      []YES  Preferred Language for Healthcare:   [x]English       []other:    Pain level:  0/10     SUBJECTIVE:  Pt 8 wks PO. Pt stated did get dopplar following last appt did have DVT stated is on Eloquis since Tues. Stated pain in calf has improved stated knee continues to feel great. Pt reports needs 1x/wk with PT due to her work schedule.      OBJECTIVE:   Observation:   Test measurements:    KNEE  AROM  Flexion 125  Extension 0          RESTRICTIONS/PRECAUTIONS: 22 s/p L knee medial meniscus root repair, chondroplasty    Exercises/Interventions:   Therapeutic Ex (90876) Sets/sec Reps Notes/CUES HEP   Bike ROM  6 min     vc    30s 3 vc    HS stretch long sitting 30s 3 vc    Quad sets 10s vc             Gait training  5 min Cues for sequencing and weight bearing precautions    Leg extensions 90-40 ecc 15# 3x10 Within ROM      MATEUS- ABD/EXT  TKE  Weight shifting/SLS  Calf stretch incline  Leg press <90  Single leg  SLS  Wall sits     50#  95#    60s  90#  60#  20s  10 sec   3x10 B  3x10L  1 min  1  3x10  3x10  5  6                   Heel raises 10x3      Toe raises 10x3      Anterior step ups  Mini squats  Lateral band walk 6in 3x10  2x10  3 laps     orange                  Pt education 10 min  Reviewed precautions, HEP with gradual progression, goals of PT, not to push through pain with ex, use of RICE principles- pt stated understanding    Manual Intervention ([id number])                                                 NMR re-education (40375)   CUES NEEDED    NMR with SLR                                                              Therapeutic Activity (27961)                                          [place]f access code:  [facility]           Therapeutic Exercise and NMR EXR  [x] (97866) Provided verbal/tactile cueing for activities related to strengthening, flexibility, endurance, ROM for improvements in LE, proximal hip, and core control with self care, mobility, lifting, ambulation. [x] (67609) Provided verbal/tactile cueing for activities related to improving balance, coordination, kinesthetic sense, posture, motor skill, proprioception to assist with LE, proximal hip, and core control in self-care, mobility, lifting, ambulation and eccentric single leg control.      NMR and Therapeutic Activities:    [x] (31606 or 71751) Provided verbal/tactile cueing for activities related to improving balance, coordination, kinesthetic sense, posture, motor skill, proprioception and motor activation to allow for proper function of core, proximal hip and LE with self-care and ADLs and functional mobility.   [] (59758) Gait Re-education- Provided training and instruction to the patient for proper LE, core and proximal hip recruitment and positioning and eccentric body weight control with ambulation re-education including up and down stairs     Home Exercise Program:    [x] (92725) Reviewed/Progressed HEP activities related to strengthening, flexibility, endurance, ROM of core, proximal hip and LE for functional self-care, mobility, lifting and ambulation/stair navigation   [] (15590) Reviewed/Progressed HEP activities related to improving balance, coordination, kinesthetic sense, posture, motor skill, proprioception of core, proximal hip and LE for self-care, mobility, lifting, and ambulation/stair navigation      Manual Treatments:  PROM / STM / Oscillations-Mobs:  G-I, II, III, IV (PA's, Inf., Post.)  [x] (11879) Provided manual therapy to mobilize LE, proximal hip and/or LS spine soft tissue/joints for the purpose of modulating pain, promoting relaxation, increasing ROM, reducing/eliminating soft tissue swelling/inflammation/restriction, improving soft tissue extensibility and allowing for proper ROM for normal function with self-care, mobility, lifting and ambulation. Modalities:  at home   [] GAME READY (VASO)- for significant edema, swelling, pain control.      Charges:  Timed Code Treatment Minutes: 53   Total Treatment Minutes:  53   BWC:  TE TIME:  NMR TIME:  MANUAL TIME:  UNTIMED MINUTES:  Medicare Total:                 [] EVAL (LOW) 13201 (typically 20 minutes face-to-face)  [] EVAL (MOD) 56097 (typically 30 minutes face-to-face)  [] EVAL (HIGH) 11310 (typically 45 minutes face-to-face)  [] RE-EVAL     [x] CM(30661) x    2 [] IONTO  [x] NMR (15436) 1    [] VASO  [] Manual (96633) x     [] Other:  [x] TA x      [] Mech Traction (86551)  [] ES(attended) (36633)      [] ES (un) (47123):      ASSESSMENT:   See above. GOALS:      Patient stated goal: Walking no pain. Therapist goals for Patient:   Short Term Goals: To be achieved in: 2 weeks  1. Independent in HEP and progression per patient tolerance, in order to prevent re-injury. [x] Progressing: [] Met: [] Not Met: [] Adjusted     2. Patient will have a decrease in pain to facilitate improvement in movement, function, and ADLs as indicated by Functional Deficits. [x] Progressing: [] Met: [] Not Met: [] Adjusted     Long Term Goals: To be achieved in: 12 weeks  1. FOTO score will match or exceed predicted score to assist with reaching prior level of function. [x] Progressing: [] Met: [] Not Met: [] Adjusted     2. Patient will demonstrate increased AROM to 0-130 to allow for proper joint functioning as indicated by patients Functional Deficits. [x] Progressing: [] Met: [] Not Met: [] Adjusted     3. Patient will demonstrate an increase in Strength to good proximal hip strength and control, within 5lb HHD in LE to allow for proper functional mobility as indicated by patients Functional Deficits. [x] Progressing: [] Met: [] Not Met: [] Adjusted     4. Patient will return to functional activities standing/walking 20-30 mins I no AD without increased symptoms or restriction. [x] Progressing: [] Met: [] Not Met: [] Adjusted     5. Ascend/descend 1 flight of steps I no AD (patient specific functional goal)    [x] Progressing: [] Met: [] Not Met: [] Adjusted          Overall Progression Towards Functional goals/ Treatment Progress Update:  [x] Patient is progressing as expected towards functional goals listed. [] Progression is slowed due to complexities/Impairments listed. [] Progression has been slowed due to co-morbidities.   [] Plan just implemented, too soon to assess goals progression <30days   [] Goals require adjustment due to lack of progress  [] Patient is not progressing as expected and requires additional follow up with physician  [] Other    Prognosis for POC: [x] Good [] Fair  [] Poor      Patient requires continued skilled intervention: [x] Yes  [] No    Treatment/Activity Tolerance:  [x] Patient able to complete treatment  [] Patient limited by fatigue  [] Patient limited by pain    [] Patient limited by other medical complications  [] Other:     Return to Play: (if applicable)   []  Stage 1: Intro to Strength   []  Stage 2: Return to Run and Strength   []  Stage 3: Return to Jump and Strength   []  Stage 4: Dynamic Strength and Agility   []  Stage 5: Sport Specific Training     []  Ready to Return to Play, Meets All Above Stages   []  Not Ready for Return to Sports   Comments:                          PLAN: See eval  [x] Continue per plan of care [] Alter current plan (see comments above)  [] Plan of care initiated [] Hold pending MD visit [] Discharge    Electronically signed by:  Martha Hendricks PT    Note: If patient does not return for scheduled/ recommended follow up visits, this note will serve as a discharge from care along with most recent update on progress.

## 2022-12-12 ENCOUNTER — OFFICE VISIT (OUTPATIENT)
Dept: ORTHOPEDIC SURGERY | Age: 47
End: 2022-12-12

## 2022-12-12 ENCOUNTER — HOSPITAL ENCOUNTER (OUTPATIENT)
Dept: PHYSICAL THERAPY | Age: 47
Setting detail: THERAPIES SERIES
Discharge: HOME | End: 2022-12-12
Payer: COMMERCIAL

## 2022-12-12 VITALS — WEIGHT: 225 LBS | HEIGHT: 64 IN | BODY MASS INDEX: 38.41 KG/M2

## 2022-12-12 DIAGNOSIS — Z47.89 ORTHOPEDIC AFTERCARE: Primary | ICD-10-CM

## 2022-12-12 PROCEDURE — 97110 THERAPEUTIC EXERCISES: CPT

## 2022-12-12 PROCEDURE — 99024 POSTOP FOLLOW-UP VISIT: CPT | Performed by: PHYSICIAN ASSISTANT

## 2022-12-12 PROCEDURE — 97112 NEUROMUSCULAR REEDUCATION: CPT

## 2022-12-12 RX ORDER — APIXABAN 5 MG/1
TABLET, FILM COATED ORAL
COMMUNITY
Start: 2022-12-08

## 2022-12-12 NOTE — PROGRESS NOTES
POST OPERATIVE ORTHOPAEDIC NOTE    DOS: 9/23/2022  PROCEDURES: Left knee arthroscopic posterior medial meniscal root repair and chondroplasty    The patient has been recovering and is being seen for Dr. Kishan Rowe. . The patient states the pain is 0-3/10 pain. Patient is currently on Eliquis due to a DVT that was noted at her last postop visit. She is to be on the Eliquis for another 2 months. Today was her last outpatient physical therapy visit and she has graduated to a gym program.  We did discuss the gym program in detail. She is presently ambulating full weightbearing without assistive device, limp, or pain. Pain Assessment  Location of Pain: Knee  Location Modifiers: Left  Severity of Pain: 3  Quality of Pain: Other (Comment)  Duration of Pain: Other (Comment)  Frequency of Pain: Other (Comment)]    Focused pertinent physical examination of the operative extremity:  Wounds C/D/I, well healed surgical incisions  No erythema or drainage  No swelling in the leg  Negative Homans  Skin intact throughout  5/5 IP Q H TA G EHL  SILT DP SP LP MP S S  +2 DP pulse      Assessment and plan: The patient is now almost 3 months status post the above listed procedure and is recovering. Possible postop DVT  Patient is to discontinue her outpatient physical therapy at this point may proceed to a gym exercise program which we did detailed today. She may return to work as a  at this point. Follow-up: With Dr. Kishan Rowe as needed. Josemanuel Leos PA-C  Board certified by the [facility]        Disclaimer: This note was dictated with voice recognition software. Though review and correction are routinely performed, please contact the office/medical records for any errors requiring correction.

## 2022-12-12 NOTE — FLOWSHEET NOTE
[address] [facility], [address] [place] [address], [address] [place] [address] [address]  Phone: [phone]   Fax:     [phone]     Measures Previous Current   Pain (0-10) 2-6 0   Disability %   28%   ROM 0-90 0-120                   Strength   Hip flex 5/5       Knee ext 4+/5       Knee flex 4+/5         Patient Name:  Bruce Phoenix    :  1975  MRN: [id number]  Restrictions/Precautions:    Medical/Treatment Diagnosis Information:  Diagnosis: Complex tear of medial meniscus of left knee as current injury S83.232A  Treatment Diagnosis: Left knee pain M25. 921  Insurance/Certification information:  PT Insurance Information: [facility]  Physician Information:   Dr. Leland Lopez  Has the plan of care been signed (Y/N):        [x]  Yes  []  No     Date of Patient follow up with Physician: 10/3/22    Is this a Progress Report:     [x]  Yes  []  No      If Yes:  Date Range for reporting period:  Beginnin22 ------------ Endin22  Beginnin22 ------------ Endin22    Progress report will be due (10 Rx or 30 days whichever is less): 49/10/78     Recertification will be due (POC Duration  / 90 days whichever is less): 22      Visit # Insurance Allowable Auth Required   In Person 10 60 []  Yes     []  No    Tele Health 0  []  Yes     []  No    Total 10       FOTO Score:      Date assessed:          Latex Allergy:  [x]NO      []YES  Preferred Language for Healthcare:   [x]English       []other:    Pain level:  0/10     SUBJECTIVE:  Pt 11 wks PO. Pt stated knee is a little sore today. Stated might have done a little more walking last couple days. Pt reports needs 1x/wk with PT due to her work schedule.      OBJECTIVE:   Observation:   Test measurements:    KNEE  AROM  Flexion 125  Extension 0          RESTRICTIONS/PRECAUTIONS: 22 s/p L knee medial meniscus root repair, chondroplasty    Exercises/Interventions:   Therapeutic Ex (39566) Sets/sec Reps Notes/CUES HEP   Bike ROM  6 min     vc    30s 3 vc    HS stretch long sitting 30s 3 vc    Quad sets 10s vc             Gait training  5 min Cues for sequencing and weight bearing precautions    Leg extensions 90-40 ecc  HS curl 20#  30# 3x10  3x10 Within ROM      MATEUS- ABD/EXT  TKE  Weight shifting/SLS  Calf stretch incline  Leg press <90  Single leg  SLS  Wall sits     50#  95#    60s  100#  65#  20s  10 sec   3x10 B  3x10L  1 min  1  3x10  3x10  5  6                   Heel raises 10x3      Toe raises 10x3      Anterior step ups  Mini squats  Lateral band walk 6in 3x10  3x10  3 laps   Red versa  Red versa                  Pt education 10 min  Reviewed precautions, HEP with gradual progression, goals of PT, not to push through pain with ex, use of RICE principles- pt stated understanding    Manual Intervention ([id number])                                                 NMR re-education (22872)   CUES NEEDED    NMR with SLR                                                              Therapeutic Activity (03122)                                          [employer] access code:  [facility]           Therapeutic Exercise and NMR EXR  [x] (24401) Provided verbal/tactile cueing for activities related to strengthening, flexibility, endurance, ROM for improvements in LE, proximal hip, and core control with self care, mobility, lifting, ambulation. [x] (13958) Provided verbal/tactile cueing for activities related to improving balance, coordination, kinesthetic sense, posture, motor skill, proprioception to assist with LE, proximal hip, and core control in self-care, mobility, lifting, ambulation and eccentric single leg control.      NMR and Therapeutic Activities:    [x] (55378 or 92920) Provided verbal/tactile cueing for activities related to improving balance, coordination, kinesthetic sense, posture, motor skill, proprioception and motor activation to allow for proper function of core, proximal hip and LE with self-care and ADLs and functional mobility.   [] (64900) Gait Re-education- Provided training and instruction to the patient for proper LE, core and proximal hip recruitment and positioning and eccentric body weight control with ambulation re-education including up and down stairs     Home Exercise Program:    [x] (60709) Reviewed/Progressed HEP activities related to strengthening, flexibility, endurance, ROM of core, proximal hip and LE for functional self-care, mobility, lifting and ambulation/stair navigation   [] (96706) Reviewed/Progressed HEP activities related to improving balance, coordination, kinesthetic sense, posture, motor skill, proprioception of core, proximal hip and LE for self-care, mobility, lifting, and ambulation/stair navigation      Manual Treatments:  PROM / STM / Oscillations-Mobs:  G-I, II, III, IV (PA's, Inf., Post.)  [x] (46513) Provided manual therapy to mobilize LE, proximal hip and/or LS spine soft tissue/joints for the purpose of modulating pain, promoting relaxation, increasing ROM, reducing/eliminating soft tissue swelling/inflammation/restriction, improving soft tissue extensibility and allowing for proper ROM for normal function with self-care, mobility, lifting and ambulation. Modalities:  at home   [] GAME READY (VASO)- for significant edema, swelling, pain control. Charges:  Timed Code Treatment Minutes: 45   Total Treatment Minutes:  45   BWC:  TE TIME:  NMR TIME:  MANUAL TIME:  UNTIMED MINUTES:  Medicare Total:                 [] EVAL (LOW) 88530 (typically 20 minutes face-to-face)  [] EVAL (MOD) 24565 (typically 30 minutes face-to-face)  [] EVAL (HIGH) 37474 (typically 45 minutes face-to-face)  [] RE-EVAL     [x] CU(43317) x    2 [] IONTO  [x] NMR (38022) 1    [] VASO  [] Manual (22053) x     [] Other:  [] TA x      [] Mech Traction (62851)  [] ES(attended) (09716)      [] ES (un) (08489):      ASSESSMENT:    ROM, strength and function gradually progressing.  Pt reported no longer has the calf pain and has continued with her Eloquis, stated does have appt with PCP. Stated no complaints with ADL's. Stated does want to transition to I with HEP with gym program soon due to deductible will start over at beginning of the year. GOALS:      Patient stated goal: Walking no pain. Therapist goals for Patient:   Short Term Goals: To be achieved in: 2 weeks  1. Independent in HEP and progression per patient tolerance, in order to prevent re-injury. [] Progressing: [x] Met: [] Not Met: [] Adjusted     2. Patient will have a decrease in pain to facilitate improvement in movement, function, and ADLs as indicated by Functional Deficits. [] Progressing: [x] Met: [] Not Met: [] Adjusted     Long Term Goals: To be achieved in: 12 weeks  1. FOTO score will match or exceed predicted score to assist with reaching prior level of function. [x] Progressing: [] Met: [] Not Met: [] Adjusted     2. Patient will demonstrate increased AROM to 0-130 to allow for proper joint functioning as indicated by patients Functional Deficits. [] Progressing: [x] Met: [] Not Met: [] Adjusted     3. Patient will demonstrate an increase in Strength to good proximal hip strength and control, within 5lb HHD in LE to allow for proper functional mobility as indicated by patients Functional Deficits. [x] Progressing: [] Met: [] Not Met: [] Adjusted     4. Patient will return to functional activities standing/walking 20-30 mins I no AD without increased symptoms or restriction. [] Progressing: [x] Met: [] Not Met: [] Adjusted     5. Ascend/descend 1 flight of steps I no AD (patient specific functional goal)    [x] Progressing: [] Met: [] Not Met: [] Adjusted          Overall Progression Towards Functional goals/ Treatment Progress Update:  [x] Patient is progressing as expected towards functional goals listed. [] Progression is slowed due to complexities/Impairments listed.   [] Progression has been slowed due to co-morbidities. [] Plan just implemented, too soon to assess goals progression <30days   [] Goals require adjustment due to lack of progress  [] Patient is not progressing as expected and requires additional follow up with physician  [] Other    Prognosis for POC: [x] Good [] Fair  [] Poor      Patient requires continued skilled intervention: [x] Yes  [] No    Treatment/Activity Tolerance:  [x] Patient able to complete treatment  [] Patient limited by fatigue  [] Patient limited by pain    [] Patient limited by other medical complications  [] Other:     Return to Play: (if applicable)   []  Stage 1: Intro to Strength   []  Stage 2: Return to Run and Strength   []  Stage 3: Return to Jump and Strength   []  Stage 4: Dynamic Strength and Agility   []  Stage 5: Sport Specific Training     []  Ready to Return to Play, Meets All Above Stages   []  Not Ready for Return to Sports   Comments:                          PLAN: See eval  [x] Continue per plan of care [] Alter current plan (see comments above)  [] Plan of care initiated [] Hold pending MD visit [] Discharge    Electronically signed by:  Martha Schulz PT    Note: If patient does not return for scheduled/ recommended follow up visits, this note will serve as a discharge from care along with most recent update on progress.

## 2022-12-19 ENCOUNTER — HOSPITAL ENCOUNTER (OUTPATIENT)
Dept: PHYSICAL THERAPY | Age: 47
Setting detail: THERAPIES SERIES
End: 2022-12-19
Payer: COMMERCIAL

## 2023-08-14 ENCOUNTER — HOSPITAL ENCOUNTER (OUTPATIENT)
Dept: MAMMOGRAPHY | Age: 48
Discharge: HOME | End: 2023-08-14
Payer: COMMERCIAL

## 2023-08-14 VITALS — BODY MASS INDEX: 36.7 KG/M2 | HEIGHT: 64 IN | WEIGHT: 215 LBS

## 2023-08-14 DIAGNOSIS — Z12.31 BREAST CANCER SCREENING BY MAMMOGRAM: ICD-10-CM

## 2023-08-14 PROCEDURE — 77063 BREAST TOMOSYNTHESIS BI: CPT

## 2023-10-09 ENCOUNTER — OFFICE VISIT (OUTPATIENT)
Dept: ORTHOPEDIC SURGERY | Age: 48
End: 2023-10-09
Payer: COMMERCIAL

## 2023-10-09 DIAGNOSIS — M17.12 PRIMARY OSTEOARTHRITIS OF LEFT KNEE: Primary | ICD-10-CM

## 2023-10-09 DIAGNOSIS — M25.562 LEFT KNEE PAIN, UNSPECIFIED CHRONICITY: ICD-10-CM

## 2023-10-09 PROCEDURE — 99213 OFFICE O/P EST LOW 20 MIN: CPT | Performed by: ORTHOPAEDIC SURGERY

## 2023-10-09 RX ORDER — MELOXICAM 15 MG/1
15 TABLET ORAL DAILY PRN
Qty: 30 TABLET | Refills: 0 | Status: SHIPPED | OUTPATIENT
Start: 2023-10-09

## 2023-10-09 NOTE — PROGRESS NOTES
FOLLOW UP ORTHOPAEDIC NOTE    The patient follows up today for reevaluation of left knee pain. Patient states 7/10 pain left knee. This is lateral and anterior base. She states that she is done well status post left knee arthroscopic posterior medial meniscal root repair and chondroplasty on 2022. She is noticed increasing discomfort in the left knee. She denies swelling. She states posterior lateral knee discomfort as well occasionally. She denies trauma. She has not done any recent therapy. She also stopped going to the gym and still admits that when she had been going to the gym she felt better. She has not been taking any anti-inflammatories by mouth on a consistent basis. PE:  AAOx3  RR  Unlabored breathing  Skin warm and moist  Focused physical examination of the left knee  0/120 degrees active range of motion. Stable to varus and valgus at 0 and 30 degrees. Nontender to palpation medial/lateral joint line. Positive tenderness palpation lateral patellar facet. Trace Nilesh's. Negative medial/lateral Sofia. Negative anterior drawer negative posterior drawer, 1A Lachman. Remainder of neurovascular examination unchanged    Pertinent radiographs/imagin view left knee 10/9/2023: Unchanged arthrosis with small osteophytes appreciated lateral compartment and patellofemoral with mild to moderate arthrosis. Diagnosis Orders   1. Primary osteoarthritis of left knee  meloxicam (MOBIC) 15 MG tablet      2. Left knee pain, unspecified chronicity            Assessment and plan: 57 female with continued subjective symptoms of left knee pain with known, correlating diagnosis of left knee osteoarthritis acute on chronic exacerbation.  -Time of 16 minutes was spent coordinating and discussing the clinical findings and diagnostic imaging results as they pertain to the patient's presenting subjective symptoms.  -I had a pleasant discussion with the patient today.   I reviewed with her that

## 2024-09-23 ENCOUNTER — TELEPHONE (OUTPATIENT)
Dept: ORTHOPEDIC SURGERY | Age: 49
End: 2024-09-23

## 2024-09-23 ENCOUNTER — OFFICE VISIT (OUTPATIENT)
Dept: ORTHOPEDIC SURGERY | Age: 49
End: 2024-09-23
Payer: COMMERCIAL

## 2024-09-23 VITALS — WEIGHT: 158 LBS | BODY MASS INDEX: 26.98 KG/M2 | HEIGHT: 64 IN

## 2024-09-23 DIAGNOSIS — M25.50 MULTIPLE JOINT PAIN: ICD-10-CM

## 2024-09-23 DIAGNOSIS — M54.50 LUMBAR PAIN: Primary | ICD-10-CM

## 2024-09-23 PROCEDURE — 99214 OFFICE O/P EST MOD 30 MIN: CPT | Performed by: PHYSICIAN ASSISTANT

## 2024-09-23 NOTE — TELEPHONE ENCOUNTER
General Question     Subject: PLEASE SEND RHEUMATOLOGY REFERRAL   Patient and /or Facility Request: Rogelio Zhong   Contact Number: [phone]     . PLEASE SEND MRI ORDER TO DR ECKERT. THE Pt  CAN'T SCHEDULE APPT UNTIL RCV'D .      PLEASE CALL Pt AND LET THEM KNOW, SO THEY CAN CALL AND GET SCHEDULED    SEND TO:    DR TOMEKA LOPEZ     FAX # [phone]
